# Patient Record
Sex: FEMALE | Race: BLACK OR AFRICAN AMERICAN | NOT HISPANIC OR LATINO | Employment: FULL TIME | URBAN - METROPOLITAN AREA
[De-identification: names, ages, dates, MRNs, and addresses within clinical notes are randomized per-mention and may not be internally consistent; named-entity substitution may affect disease eponyms.]

---

## 2017-03-21 ENCOUNTER — HISTORICAL (OUTPATIENT)
Dept: PHYSICAL THERAPY | Facility: HOSPITAL | Age: 47
End: 2017-03-21

## 2017-03-28 ENCOUNTER — HISTORICAL (OUTPATIENT)
Dept: PHYSICAL THERAPY | Facility: HOSPITAL | Age: 47
End: 2017-03-28

## 2017-03-29 ENCOUNTER — HISTORICAL (OUTPATIENT)
Dept: PHYSICAL THERAPY | Facility: HOSPITAL | Age: 47
End: 2017-03-29

## 2017-04-04 ENCOUNTER — HISTORICAL (OUTPATIENT)
Dept: PHYSICAL THERAPY | Facility: HOSPITAL | Age: 47
End: 2017-04-04

## 2017-04-06 ENCOUNTER — HISTORICAL (OUTPATIENT)
Dept: PHYSICAL THERAPY | Facility: HOSPITAL | Age: 47
End: 2017-04-06

## 2017-04-12 ENCOUNTER — HISTORICAL (OUTPATIENT)
Dept: PHYSICAL THERAPY | Facility: HOSPITAL | Age: 47
End: 2017-04-12

## 2017-04-17 ENCOUNTER — HISTORICAL (OUTPATIENT)
Dept: PHYSICAL THERAPY | Facility: HOSPITAL | Age: 47
End: 2017-04-17

## 2017-04-24 ENCOUNTER — HISTORICAL (OUTPATIENT)
Dept: PHYSICAL THERAPY | Facility: HOSPITAL | Age: 47
End: 2017-04-24

## 2017-04-24 ENCOUNTER — HISTORICAL (OUTPATIENT)
Dept: ADMINISTRATIVE | Facility: HOSPITAL | Age: 47
End: 2017-04-24

## 2017-05-10 ENCOUNTER — HISTORICAL (OUTPATIENT)
Dept: ORTHOPEDICS | Facility: CLINIC | Age: 47
End: 2017-05-10

## 2017-08-23 ENCOUNTER — HISTORICAL (OUTPATIENT)
Dept: ADMINISTRATIVE | Facility: HOSPITAL | Age: 47
End: 2017-08-23

## 2018-05-23 ENCOUNTER — HISTORICAL (OUTPATIENT)
Dept: ADMINISTRATIVE | Facility: HOSPITAL | Age: 48
End: 2018-05-23

## 2018-06-07 ENCOUNTER — TELEPHONE (OUTPATIENT)
Dept: OPHTHALMOLOGY | Facility: CLINIC | Age: 48
End: 2018-06-07

## 2018-06-07 ENCOUNTER — INITIAL CONSULT (OUTPATIENT)
Dept: OPHTHALMOLOGY | Facility: CLINIC | Age: 48
End: 2018-06-07
Payer: MEDICAID

## 2018-06-07 DIAGNOSIS — H50.10 EXOTROPIA: Primary | ICD-10-CM

## 2018-06-07 DIAGNOSIS — H50.30 INTERMITTENT EXOTROPIA: Primary | ICD-10-CM

## 2018-06-07 PROCEDURE — 99202 OFFICE O/P NEW SF 15 MIN: CPT | Mod: PBBFAC | Performed by: OPHTHALMOLOGY

## 2018-06-07 PROCEDURE — 92060 SENSORIMOTOR EXAMINATION: CPT | Mod: PBBFAC | Performed by: OPHTHALMOLOGY

## 2018-06-07 PROCEDURE — 99999 PR PBB SHADOW E&M-NEW PATIENT-LVL II: CPT | Mod: PBBFAC,,, | Performed by: OPHTHALMOLOGY

## 2018-06-07 PROCEDURE — 92060 SENSORIMOTOR EXAMINATION: CPT | Mod: 26,S$PBB,, | Performed by: OPHTHALMOLOGY

## 2018-06-07 PROCEDURE — 92002 INTRM OPH EXAM NEW PATIENT: CPT | Mod: S$PBB,,, | Performed by: OPHTHALMOLOGY

## 2018-06-07 RX ORDER — TRAMADOL HYDROCHLORIDE 50 MG/1
TABLET ORAL
Refills: 0 | COMMUNITY
Start: 2018-05-24 | End: 2023-12-10

## 2018-06-07 RX ORDER — METHOCARBAMOL 500 MG/1
TABLET, FILM COATED ORAL
Refills: 0 | COMMUNITY
Start: 2018-05-30 | End: 2023-12-10

## 2018-06-07 NOTE — PROGRESS NOTES
HPI     48 yo female c/o OS intermittently turning outward. She says she will   sometimes see double. She thinks the images are horizontal. She says this   has been going on for about 25 years. No previous eye surgeries.     Last edited by Sara Andrea on 6/7/2018 11:50 AM. (History)            Assessment /Plan     For exam results, see Encounter Report.    Intermittent exotropia      Consider surgical correction to correct Exotropia. The details of the surgical procedure were discussed. The risks of the procedure were identified and explained. Treatment alternatives were listed.    Procedure plan would be to loosen the outer muscles of each eye.  95% success rate with 5% chance need for repeat surgery in a life time.  Use of adjustable suture to ensure a better out come. Educated about chance for over corrections creating ET and diplopia.   Can adjust if ET after surgery or repeat surgery    Mrs. Alexander understands procedure and risk and would like to schedule surgery     This service was scribed by Livia Brink for, and in the presence of Dr Canchola who personally performed this service.    Livia Brink, COA    Katrin Canchola MD

## 2018-06-07 NOTE — PROGRESS NOTES
HPI     48 yo female c/o OS intermittently turning outward. She says she will   sometimes see double. She thinks the images are horizontal. She says this   has been going on for about 25 years. No previous eye surgeries.     Last edited by Sara Andrea on 6/7/2018 11:50 AM. (History)            Assessment /Plan     For exam results, see Encounter Report.    Intermittent exotropia      With diplopia  Consider surgical correction. The details of the surgical procedure were discussed. The risks of the procedure were identified and explained. Treatment alternatives were listed.   Recess LR OU

## 2018-06-07 NOTE — LETTER
June 7, 2018      Brian Flores MD  [address] [place] [address]           [facility]hthal[facility]  [address]  [place] [address]  Phone: [phone]  Fax: [phone]          Patient: Erlinda Alexander   MR Number: [id number]   YOB: 1970   Date of Visit: 6/7/2018       Dear Dr. Brian Flores:    Thank you for referring Erlinda Alexander to me for evaluation. Attached you will find relevant portions of my assessment and plan of care.    If you have questions, please do not hesitate to call me. I look forward to following Erlinda Alexander along with you.    Sincerely,    MAC Canchola Jr., MD    Enclosure  CC:  No Recipients    If you would like to receive this communication electronically, please contact [email or web address] or [phone] to request more information on [facility] access.    For providers and/or their staff who would like to refer a patient to [facility], please contact us through our one-stop-shop provider referral line, [facility], at [phone].    If you feel you have received this communication in error or would no longer like to receive these types of communications, please e-mail [email or web address]

## 2018-06-18 NOTE — BRIEF OP NOTE
Brief Operative Note  Ophthalmology Service      Date of Procedure: (Not on file)     Attending Physician: MAC Canchola Jr., MD     Assistant: LYNN Vasques MD    Pre-Operative Diagnosis: Exotropia [H50.10]     Post-Operative Diagnosis: Same as pre-operative diagnosis    Treatments/Procedures: Recess LR OU 7.5 mmw/adj OS    Intraoperative Findings: nl EOM's    Anesthesia: General    Complications: None    Estimated Blood Loss: < 5 cc    Specimens: None    -------------------------------------------------------------  Full dictated Operative Report to follow.  -------------------------------------------------------------

## 2018-06-19 ENCOUNTER — TELEPHONE (OUTPATIENT)
Dept: OPTOMETRY | Facility: CLINIC | Age: 48
End: 2018-06-19

## 2018-06-19 ENCOUNTER — ANESTHESIA EVENT (OUTPATIENT)
Dept: SURGERY | Facility: HOSPITAL | Age: 48
End: 2018-06-19
Payer: MEDICAID

## 2018-06-19 RX ORDER — METOPROLOL TARTRATE 25 MG/1
25 TABLET, FILM COATED ORAL 2 TIMES DAILY
COMMUNITY

## 2018-06-19 NOTE — DISCHARGE SUMMARY
Discharge Summary  Ophthalmology Service      Admit Date: 6/20/2018     Discharge Date: 6/19/2018     Attending Physician: MAC Canchola Jr., MD     Discharge Physician: Fely Vasques MD    Discharged Condition: Good    Reason for Admission: Exotropia [H50.10]  Strabismus [H50.9]     Treatments/Procedures: Strabismus Surgery (see dictated report for details).    Hospital Course: Stable, dictated    Consults: None    Significant Diagnostic Studies: None    Disposition: Home    Patient Instructions:   - Resume same diet as prior to surgery  - Resume activity as tolerated with no swimming for 1 week  - Apply ice packs to surgical eye(s) for 72 hours as tolerated  - Call the Ophthalmology clinic to schedule an appointment with Dr. Canchola.    Patient Instructions:   Current Discharge Medication List      CONTINUE these medications which have NOT CHANGED    Details   methocarbamol (ROBAXIN) 500 MG Tab Refills: 0      metoprolol tartrate (LOPRESSOR) 25 MG tablet Take 25 mg by mouth 2 (two) times daily.      traMADol (ULTRAM) 50 mg tablet Refills: 0               Discharge Procedure Orders  Diet Adult Regular     Activity as tolerated     No dressing needed

## 2018-06-19 NOTE — DISCHARGE INSTRUCTIONS
ACTIVITY LEVEL:  If you received sedation or an anesthetic, you may feel sleepy for several hours. Rest until you are more awake. Gradually resume your normal activities in two days. Children may return to school in 2-3 days. It is all right to watch television or to read. Swimming is permitted in two weeks.    CARE OF INCISION:  A blood-tinged discharge from the eye is normal. This can be gently washed away with a clean, damp wash cloth. Do not use water, gauze, or cotton to wipe the lids. The morning after surgery, you may have difficulty opening your eyes. This is normal. If dry blood or secretions are holding the lids together, you may open the eyes by gently  the lids from above and below. Please wash your hands thoroughly before doing this. If the lids dont open, do not force them apart. (A child will cry and the tears will soften the secretions and a parent can try again later.) Use cool compresses to the eyes for 24 hours, if tolerated for comfort. Do not place any medication in the eye unless otherwise instructed.    BATHING:  Keep your face out of water for five days after surgery - NO SHOWERS.    DIET:  At home, continue with liquids, and if there is no nausea, you may eat a soft diet. Gradually resume your  normal diet.    PAIN:  If needed for discomfort, you can use cold compresses and take Tylenol (usual recommended dose) every four  hours. Generally, do not take Tylenol more than four times a day.    WHEN TO CALL THE DOCTOR:   Any increase in the amount of swelling of the eyes and adjacent tissues   Heavy yellow discharge from the eyes   Fever over 101ºF (38.4ºC)    A purple discoloration of the lower lids is common. It appears a few days after surgery and does not affect healing. You may experience double vision after surgery. This is normal and will disappear in a few days or weeks. Prescription glasses may be worn unless otherwise instructed. The eyes may be unusually sensitive to  light for several days. Dark sunglasses will help.    FOR EMERGENCIES:  If any unusual problems or difficulties occur, contact Dr. Canchola or the resident at [phone] (page ) or at the Clinic office, [phone].

## 2018-06-19 NOTE — PRE-PROCEDURE INSTRUCTIONS
PreOp Instructions given:     - Verbal medication information (what to hold and what to take)   - NPO guidelines   - Arrival place directions given;  - Bathing with antibacterial soap   - Don't wear any jewelry or bring any valuables AM of surgery   - No makeup or moisturizer to face   - No perfume/cologne, powder, lotions or aftershave     Pt. verbalized understanding.   Denies any  history of side effects or issues with anesthesia or sedation.

## 2018-06-20 ENCOUNTER — HOSPITAL ENCOUNTER (OUTPATIENT)
Facility: HOSPITAL | Age: 48
Discharge: HOME | End: 2018-06-20
Attending: OPHTHALMOLOGY | Admitting: OPHTHALMOLOGY
Payer: MEDICAID

## 2018-06-20 ENCOUNTER — TELEPHONE (OUTPATIENT)
Dept: OPHTHALMOLOGY | Facility: CLINIC | Age: 48
End: 2018-06-20

## 2018-06-20 ENCOUNTER — NURSE TRIAGE (OUTPATIENT)
Dept: ADMINISTRATIVE | Facility: CLINIC | Age: 48
End: 2018-06-20

## 2018-06-20 ENCOUNTER — ANESTHESIA (OUTPATIENT)
Dept: SURGERY | Facility: HOSPITAL | Age: 48
End: 2018-06-20
Payer: MEDICAID

## 2018-06-20 VITALS
SYSTOLIC BLOOD PRESSURE: 128 MMHG | DIASTOLIC BLOOD PRESSURE: 78 MMHG | WEIGHT: 185 LBS | OXYGEN SATURATION: 100 % | HEIGHT: 61 IN | RESPIRATION RATE: 14 BRPM | HEART RATE: 85 BPM | BODY MASS INDEX: 34.93 KG/M2 | TEMPERATURE: 98 F

## 2018-06-20 DIAGNOSIS — H50.9 STRABISMUS: ICD-10-CM

## 2018-06-20 DIAGNOSIS — H50.10 EXOTROPIA: Primary | ICD-10-CM

## 2018-06-20 LAB
B-HCG UR QL: NEGATIVE
CTP QC/QA: YES

## 2018-06-20 PROCEDURE — 71000033 HC RECOVERY, INTIAL HOUR: Performed by: OPHTHALMOLOGY

## 2018-06-20 PROCEDURE — 36000706: Performed by: OPHTHALMOLOGY

## 2018-06-20 PROCEDURE — 71000039 HC RECOVERY, EACH ADD'L HOUR: Performed by: OPHTHALMOLOGY

## 2018-06-20 PROCEDURE — 67311 REVISE EYE MUSCLE: CPT | Mod: 50,,, | Performed by: OPHTHALMOLOGY

## 2018-06-20 PROCEDURE — 67335 EYE SUTURE DURING SURGERY: CPT | Mod: LT,,, | Performed by: OPHTHALMOLOGY

## 2018-06-20 PROCEDURE — 71000016 HC POSTOP RECOV ADDL HR: Performed by: OPHTHALMOLOGY

## 2018-06-20 PROCEDURE — 25000003 PHARM REV CODE 250: Performed by: ANESTHESIOLOGY

## 2018-06-20 PROCEDURE — 37000009 HC ANESTHESIA EA ADD 15 MINS: Performed by: OPHTHALMOLOGY

## 2018-06-20 PROCEDURE — 36000707: Performed by: OPHTHALMOLOGY

## 2018-06-20 PROCEDURE — D9220A PRA ANESTHESIA: Mod: ,,, | Performed by: ANESTHESIOLOGY

## 2018-06-20 PROCEDURE — 25000003 PHARM REV CODE 250: Performed by: OPHTHALMOLOGY

## 2018-06-20 PROCEDURE — 37000008 HC ANESTHESIA 1ST 15 MINUTES: Performed by: OPHTHALMOLOGY

## 2018-06-20 PROCEDURE — 71000015 HC POSTOP RECOV 1ST HR: Performed by: OPHTHALMOLOGY

## 2018-06-20 PROCEDURE — 63600175 PHARM REV CODE 636 W HCPCS: Performed by: STUDENT IN AN ORGANIZED HEALTH CARE EDUCATION/TRAINING PROGRAM

## 2018-06-20 PROCEDURE — 81025 URINE PREGNANCY TEST: CPT | Performed by: ANESTHESIOLOGY

## 2018-06-20 RX ORDER — MIDAZOLAM HYDROCHLORIDE 1 MG/ML
INJECTION, SOLUTION INTRAMUSCULAR; INTRAVENOUS
Status: DISCONTINUED | OUTPATIENT
Start: 2018-06-20 | End: 2018-06-20

## 2018-06-20 RX ORDER — ACETAMINOPHEN AND CODEINE PHOSPHATE 300; 30 MG/1; MG/1
1 TABLET ORAL EVERY 8 HOURS PRN
Qty: 15 TABLET | Refills: 0 | Status: SHIPPED | OUTPATIENT
Start: 2018-06-20 | End: 2023-12-10

## 2018-06-20 RX ORDER — LIDOCAINE HCL/PF 100 MG/5ML
SYRINGE (ML) INTRAVENOUS
Status: DISCONTINUED | OUTPATIENT
Start: 2018-06-20 | End: 2018-06-20

## 2018-06-20 RX ORDER — LIDOCAINE HYDROCHLORIDE 10 MG/ML
1 INJECTION, SOLUTION EPIDURAL; INFILTRATION; INTRACAUDAL; PERINEURAL ONCE
Status: COMPLETED | OUTPATIENT
Start: 2018-06-20 | End: 2018-06-20

## 2018-06-20 RX ORDER — ONDANSETRON 2 MG/ML
4 INJECTION INTRAMUSCULAR; INTRAVENOUS DAILY PRN
Status: DISCONTINUED | OUTPATIENT
Start: 2018-06-20 | End: 2018-06-20 | Stop reason: HOSPADM

## 2018-06-20 RX ORDER — NEOMYCIN SULFATE, POLYMYXIN B SULFATE, AND DEXAMETHASONE 3.5; 10000; 1 MG/G; [USP'U]/G; MG/G
OINTMENT OPHTHALMIC
Status: DISCONTINUED | OUTPATIENT
Start: 2018-06-20 | End: 2018-06-20 | Stop reason: HOSPADM

## 2018-06-20 RX ORDER — PHENYLEPHRINE HYDROCHLORIDE 25 MG/ML
SOLUTION/ DROPS OPHTHALMIC
Status: DISCONTINUED | OUTPATIENT
Start: 2018-06-20 | End: 2018-06-20 | Stop reason: HOSPADM

## 2018-06-20 RX ORDER — FENTANYL CITRATE 50 UG/ML
INJECTION, SOLUTION INTRAMUSCULAR; INTRAVENOUS
Status: DISCONTINUED | OUTPATIENT
Start: 2018-06-20 | End: 2018-06-20

## 2018-06-20 RX ORDER — SODIUM CHLORIDE 9 MG/ML
INJECTION, SOLUTION INTRAVENOUS CONTINUOUS
Status: DISCONTINUED | OUTPATIENT
Start: 2018-06-20 | End: 2018-06-20 | Stop reason: HOSPADM

## 2018-06-20 RX ORDER — PROPOFOL 10 MG/ML
VIAL (ML) INTRAVENOUS
Status: DISCONTINUED | OUTPATIENT
Start: 2018-06-20 | End: 2018-06-20

## 2018-06-20 RX ORDER — ONDANSETRON 2 MG/ML
INJECTION INTRAMUSCULAR; INTRAVENOUS
Status: DISCONTINUED | OUTPATIENT
Start: 2018-06-20 | End: 2018-06-20

## 2018-06-20 RX ORDER — NEOMYCIN SULFATE, POLYMYXIN B SULFATE, AND DEXAMETHASONE 3.5; 10000; 1 MG/G; [USP'U]/G; MG/G
OINTMENT OPHTHALMIC
Status: DISCONTINUED
Start: 2018-06-20 | End: 2018-06-20 | Stop reason: HOSPADM

## 2018-06-20 RX ORDER — SODIUM CHLORIDE 0.9 % (FLUSH) 0.9 %
3 SYRINGE (ML) INJECTION
Status: DISCONTINUED | OUTPATIENT
Start: 2018-06-20 | End: 2018-06-20 | Stop reason: HOSPADM

## 2018-06-20 RX ORDER — HYDROMORPHONE HYDROCHLORIDE 1 MG/ML
0.2 INJECTION, SOLUTION INTRAMUSCULAR; INTRAVENOUS; SUBCUTANEOUS EVERY 5 MIN PRN
Status: DISCONTINUED | OUTPATIENT
Start: 2018-06-20 | End: 2018-06-20 | Stop reason: HOSPADM

## 2018-06-20 RX ORDER — PHENYLEPHRINE HYDROCHLORIDE 25 MG/ML
SOLUTION/ DROPS OPHTHALMIC
Status: DISCONTINUED
Start: 2018-06-20 | End: 2018-06-20 | Stop reason: HOSPADM

## 2018-06-20 RX ADMIN — PROPOFOL 200 MG: 10 INJECTION, EMULSION INTRAVENOUS at 08:06

## 2018-06-20 RX ADMIN — FENTANYL CITRATE 25 MCG: 50 INJECTION, SOLUTION INTRAMUSCULAR; INTRAVENOUS at 08:06

## 2018-06-20 RX ADMIN — ONDANSETRON 4 MG: 2 INJECTION INTRAMUSCULAR; INTRAVENOUS at 08:06

## 2018-06-20 RX ADMIN — Medication 0.2 MG: at 10:06

## 2018-06-20 RX ADMIN — LIDOCAINE HYDROCHLORIDE 100 MG: 20 INJECTION, SOLUTION INTRAVENOUS at 08:06

## 2018-06-20 RX ADMIN — SODIUM CHLORIDE: 0.9 INJECTION, SOLUTION INTRAVENOUS at 07:06

## 2018-06-20 RX ADMIN — LIDOCAINE HYDROCHLORIDE 10 MG: 10 INJECTION, SOLUTION EPIDURAL; INFILTRATION; INTRACAUDAL; PERINEURAL at 07:06

## 2018-06-20 RX ADMIN — FENTANYL CITRATE 50 MCG: 50 INJECTION, SOLUTION INTRAMUSCULAR; INTRAVENOUS at 08:06

## 2018-06-20 RX ADMIN — MIDAZOLAM HYDROCHLORIDE 2 MG: 1 INJECTION, SOLUTION INTRAMUSCULAR; INTRAVENOUS at 08:06

## 2018-06-20 NOTE — TELEPHONE ENCOUNTER
Returned Mrs. Alexander's call.  She wanted a pain pill called into [facility].  I called Dr. Canchola to relay the message.  He gave verbal orders for Tylenol 3.  Dispense 15 with no refills.  I then called Mrs. Alexander back to confirm that I was able to call in a pain pill and discussed the difference between the muscle pain and the suture pain.  Advised to use the mumtaz for the suture pain.  Gave directions to her son to help her with installing the ointment in her eyes

## 2018-06-20 NOTE — TELEPHONE ENCOUNTER
Reason for Disposition   Caller has URGENT medication question about med that PCP prescribed and triager unable to answer question    Answer Assessment - Initial Assessment Questions  Pt had eye surgery today. She called office requesting something for pain -the nurse called her back and advised yumiko had ordered pain med that she could call in. Checking with pharmacy they have not received any orders.    Protocols used: ST MEDICATION QUESTION CALL-A-    There was a note in chart about pain med authorized but nothing found on med card.

## 2018-06-20 NOTE — TRANSFER OF CARE
"Anesthesia Transfer of Care Note    Patient: Erlinda Alexander    Procedure(s) Performed: Procedure(s) (LRB):  STRABISMUS SURGERY (Bilateral)    Patient location: PACU    Anesthesia Type: general    Transport from OR: Transported from OR on 6-10 L/min O2 by face mask with adequate spontaneous ventilation    Post pain: adequate analgesia    Post assessment: no apparent anesthetic complications and tolerated procedure well    Post vital signs: stable    Level of consciousness: awake and alert    Nausea/Vomiting: no nausea/vomiting    Complications: none          Last vitals:   Visit Vitals  /83 (BP Location: Left arm, Patient Position: Lying)   Pulse 74   Temp 37 °C (98.6 °F) (Temporal)   Resp 16   Ht 5' 1" (1.549 m)   Wt 83.9 kg (185 lb)   LMP 06/13/2018   SpO2 100%   Breastfeeding? No   BMI 34.96 kg/m²     "

## 2018-06-20 NOTE — PLAN OF CARE
Discharge instructions given to patient and son. Educated patient on procedure and post op instructions, medications and when to follow up within designated time frame. Patient verbalized understanding. Patient denies pain and nausea at this time. PO fluids tolerated.

## 2018-06-20 NOTE — H&P
Pre-Operative History & Physical  Ophthalmology      SUBJECTIVE:     History of Present Illness:  Patient is a 47 y.o. female presents with strabismus    MEDICATIONS:   PTA Medications   Medication Sig    methocarbamol (ROBAXIN) 500 MG Tab     metoprolol tartrate (LOPRESSOR) 25 MG tablet Take 25 mg by mouth 2 (two) times daily.    traMADol (ULTRAM) 50 mg tablet        ALLERGIES:   Review of patient's allergies indicates:   Allergen Reactions    Doxycycline hyclate (bulk) Rash    Latex, natural rubber Rash    Pcn [penicillins] Rash    Sulfa (sulfonamide antibiotics) Rash       PAST MEDICAL HISTORY:   Past Medical History:   Diagnosis Date    Seizures      PAST SURGICAL HISTORY:   Past Surgical History:   Procedure Laterality Date     SECTION      x2    CYST REMOVAL  2004    from back     PAST FAMILY HISTORY: History reviewed. No pertinent family history.  SOCIAL HISTORY:   Social History   Substance Use Topics    Smoking status: Current Every Day Smoker     Packs/day: 1.00     Types: Cigarettes    Smokeless tobacco: Never Used    Alcohol use No        MENTAL STATUS: Alert    REVIEW OF SYSTEMS: Negative    OBJECTIVE:     Vital Signs (Most Recent)  Temp: 98.6 °F (37 °C) (18)  Pulse: 74 (1836)  Resp: 16 (18)  BP: 135/83 (18 0736)  SpO2: 100 % (18)    Physical Exam:  General: NAD  HEENT: Strabismus  Lungs: Adequate respirations  Heart: + pulses  Abdomen: Soft    ASSESSMENT/PLAN:     Patient is a 47 y.o. female with strabismus     - Risks/benefits/alternatives of the procedure discussed with the patient   - Informed consent obtained prior to surgery and the patient voiced good understanding.   - To OR for surgical correction of strabismus

## 2018-06-20 NOTE — ANESTHESIA PREPROCEDURE EVALUATION
06/20/2018  Erlinda Alexander is a 47 y.o., female.    Anesthesia Evaluation    I have reviewed the Patient Summary Reports.    I have reviewed the Nursing Notes.   I have reviewed the Medications.     Review of Systems  Anesthesia Hx:  No problems with previous Anesthesia    Cardiovascular:   Hypertension    Pulmonary:  Pulmonary Normal    Neurological:  Neurology Normal        Physical Exam  General:  Well nourished, Obesity    Airway/Jaw/Neck:  Airway Findings: Mouth Opening: Normal Tongue: Normal  General Airway Assessment: Adult  Mallampati: III  Improves to II with phonation.  TM Distance: Normal, at least 6 cm      Dental:  Dental Findings: In tact   Chest/Lungs:  Chest/Lungs Findings: Clear to auscultation     Heart/Vascular:  Heart Findings: Rate: Normal  Rhythm: Regular Rhythm  Sounds: Normal        Mental Status:  Mental Status Findings:  Cooperative, Alert and Oriented         Anesthesia Plan  Type of Anesthesia, risks & benefits discussed:  Anesthesia Type:  general  Patient's Preference:   Intra-op Monitoring Plan: standard ASA monitors  Intra-op Monitoring Plan Comments:   Post Op Pain Control Plan: multimodal analgesia  Post Op Pain Control Plan Comments:   Induction:   IV  Beta Blocker:  Patient is not currently on a Beta-Blocker (No further documentation required).       Informed Consent: Patient understands risks and agrees with Anesthesia plan.  Questions answered. Anesthesia consent signed with patient.  ASA Score: 2     Day of Surgery Review of History & Physical:    H&P update referred to the surgeon.         Ready For Surgery From Anesthesia Perspective.

## 2018-06-20 NOTE — TELEPHONE ENCOUNTER
----- Message from Hafsa Gusman sent at 6/20/2018 12:20 PM CDT -----  Contact: Jan Aguirre(Son)  Rx Refill/Request     Is this a Refill or New Rx:  New  Rx Name and Strength:  Pt was told she would be given pain medication and need it sent out right away.  Preferred Pharmacy with phone number: [facility] Address: [address] [place] [address]/Phone: [phone]  Communication Preference:[phone]  Additional Information: Pt had surgery today.

## 2018-06-21 ENCOUNTER — TELEPHONE (OUTPATIENT)
Dept: OPHTHALMOLOGY | Facility: CLINIC | Age: 48
End: 2018-06-21

## 2018-06-21 NOTE — TELEPHONE ENCOUNTER
Spoke to Mrs. Alexander about what to expect after strabismus surgery, she expressed that she is having diplopia. I explained reasons for diplopia after exotropia repair.  Scheduled a one week appt for questionable adjustment as well as her one clay post op appointment.  Advised if over the weekend the diplopia completely clears, she can cancel the Tuesday appointment.

## 2018-06-21 NOTE — OP NOTE
DATE OF PROCEDURE:  06/20/2018    SURGEON:  MAC Canchola M.D.    ASSISTANT:  Dr. Vasques.    PREOPERATIVE DIAGNOSIS:  Strabismus - exotropia.    POSTOPERATIVE DIAGNOSIS:  Strabismus - exotropia.    PROCEDURE:  Recession, lateral rectus, both eyes, 7.5 mm with adjustable suture,   left eye.    COMPLICATIONS:  None.    BLOOD LOSS:  Less than 2 mL.    PROCEDURE IN DETAIL:  The patient was brought to the operating suite, where   general intubation anesthesia was achieved.  Both eyes were prepped and draped   in sterile fashion and lid speculum was placed in the right eye.  Through an   inferotemporal fornix incision, the lateral rectus muscle was identified and   placed on a muscle hook.  It was cleared of its check ligaments and a   double-armed 6-0 Vicryl suture was passed through the muscle belly 1 mm   posterior to the insertion.  Locked bites were placed in the middle and upper   and lower edge of the muscle.  The muscle was disinserted from the globe and   reattached to the sclera 7.5 mm posteriorly.  The conjunctiva was   reapproximated.  Attention was directed to the left eye where a similar   procedure was performed in this eye and an adjustable suture was used.  Sutures   were placed through the sclera at the insertion site and a new suture was placed   around the muscle suture and positioned 7.5 mm distally.  The muscle was   allowed to retract back this amount.  The sutures were buried and the   conjunctiva was reapproximated.  Betadine solution and Maxitrol ointment were   placed in the eye.  The patient was brought to the recovery room in good   condition.      HE/HN  dd: 06/20/2018 09:11:13 (CDT)  td: 06/20/2018 23:32:20 (CDT)  Doc ID   #[id number]  Job ID #[id number]    CC:

## 2018-06-25 ENCOUNTER — TELEPHONE (OUTPATIENT)
Dept: OPHTHALMOLOGY | Facility: CLINIC | Age: 48
End: 2018-06-25

## 2018-06-25 NOTE — TELEPHONE ENCOUNTER
06/25/18  Kenyetta returned pt call and she had some question her eyes after Strab. Sx regarding the suture and raised are near the suture. I explained to pt that the raised are is normal and will go away. Pt verified arrival time of 9 a.m. For possible adjustments. rosie       ----- Message from Anya Bowers sent at 6/25/2018 11:27 AM CDT -----  Contact: Erlinda Alexander   Pt would like to speak with  nurse please ,pt can be reached at [phone] please thank you.

## 2018-06-26 ENCOUNTER — OFFICE VISIT (OUTPATIENT)
Dept: OPHTHALMOLOGY | Facility: CLINIC | Age: 48
End: 2018-06-26
Payer: MEDICAID

## 2018-06-26 DIAGNOSIS — Z98.890 POST-OPERATIVE STATE: Primary | ICD-10-CM

## 2018-06-26 PROCEDURE — 99212 OFFICE O/P EST SF 10 MIN: CPT | Mod: PBBFAC | Performed by: OPHTHALMOLOGY

## 2018-06-26 PROCEDURE — 99999 PR PBB SHADOW E&M-EST. PATIENT-LVL II: CPT | Mod: PBBFAC,,, | Performed by: OPHTHALMOLOGY

## 2018-06-26 PROCEDURE — 99024 POSTOP FOLLOW-UP VISIT: CPT | Mod: ,,, | Performed by: OPHTHALMOLOGY

## 2018-06-26 RX ORDER — TOBRAMYCIN AND DEXAMETHASONE 3; 1 MG/ML; MG/ML
1-2 SUSPENSION/ DROPS OPHTHALMIC
Qty: 5 ML | Refills: 0 | Status: SHIPPED | OUTPATIENT
Start: 2018-06-26 | End: 2018-07-06

## 2018-06-26 NOTE — PROGRESS NOTES
HPI     47 yr old here for a one week s/p Recession, lateral rectus, both eyes,   7.5 mm with adjustable suture,left eye. Mrs. Alexander states that she has   eye pain 8/10 that comes and goes, she complains that the tylenol 3 is not   helping reduce the pain.  She has constant diplopia side by side for the   first hour after waking and then it comes and goes through out the day.    Images are close together. She is using maxitrol ointment OU twice a day   and states that vision is blurred.    Last edited by MAC Canchola Jr., MD on 6/26/2018  9:08 AM. (History)          ROS     Positive for: Eyes    Last edited by MAC Canchola Jr., MD on 6/26/2018  9:08 AM. (History)          Assessment /Plan     For exam results, see Encounter Report.    Post-operative state  -     tobramycin-dexamethasone 0.3-0.1% (TOBRADEX) 0.3-0.1 % DrpS; Place 1-2 drops into both eyes every 4 (four) hours while awake. for 10 days  Dispense: 5 mL; Refill: 0      The patient has had the desired result of the surgical procedure. Ortho in primary gaze  Educated about surface swelling, chemosis, creating discomfort.   Discontinue ointment and start Tobradex drops.  Keep one month post op exam that is scheduled     This service was scribed by Livia Brink for, and in the presence of Dr Canchola who personally performed this service.    Livia Brink, COA    Katrin Canchola MD

## 2018-07-02 NOTE — ANESTHESIA POSTPROCEDURE EVALUATION
"Anesthesia Post Evaluation    Patient: Erlinda Alexander    Procedure(s) Performed: Procedure(s) (LRB):  STRABISMUS SURGERY (Bilateral)    Final Anesthesia Type: general  Patient location during evaluation: PACU  Patient participation: Yes- Able to Participate  Level of consciousness: awake  Post-procedure vital signs: reviewed and stable  Pain management: adequate  Airway patency: patent  PONV status at discharge: No PONV  Anesthetic complications: no      Cardiovascular status: stable  Respiratory status: unassisted  Hydration status: euvolemic  Follow-up not needed.        Visit Vitals  /78   Pulse 85   Temp 36.8 °C (98.2 °F) (Temporal)   Resp 14   Ht 5' 1" (1.549 m)   Wt 83.9 kg (185 lb)   LMP 06/13/2018   SpO2 100%   Breastfeeding? No   BMI 34.96 kg/m²       Pain/Jacquie Score: No Data Recorded      "

## 2018-07-06 ENCOUNTER — TELEPHONE (OUTPATIENT)
Dept: OPHTHALMOLOGY | Facility: CLINIC | Age: 48
End: 2018-07-06

## 2018-07-06 NOTE — TELEPHONE ENCOUNTER
----- Message from Loulou Moore sent at 7/6/2018  2:24 PM CDT -----  Contact: Pt  Needs Advice    Reason for call: needs medical advice    Communication Preference: Pt can be reached at [phone]    Additional Information: Ms. Alexander states that she's left eye has been sensitive to light and feeling some stabbing pain. She would like to know if that's to be expected?

## 2018-07-06 NOTE — TELEPHONE ENCOUNTER
Spoke to Rusty Vossrosio and advised that she is having a response to the adjustable suture and advised to use artifical tears several times a day as needed for comfort

## 2018-07-24 ENCOUNTER — TELEPHONE (OUTPATIENT)
Dept: OPHTHALMOLOGY | Facility: CLINIC | Age: 48
End: 2018-07-24

## 2018-07-24 NOTE — TELEPHONE ENCOUNTER
07/24/18  Kenyetta returned pt call and pt asked to be r/s to a later date. I have r/s PO appt to August. stshanna       ----- Message from Kenzie Jordan sent at 7/24/2018 12:40 PM CDT -----  Contact: self  Pt would like to reschedule her post op.  She can be reached at [phone]

## 2018-08-13 ENCOUNTER — HISTORICAL (OUTPATIENT)
Dept: RADIOLOGY | Facility: HOSPITAL | Age: 48
End: 2018-08-13

## 2022-04-07 ENCOUNTER — HISTORICAL (OUTPATIENT)
Dept: ADMINISTRATIVE | Facility: HOSPITAL | Age: 52
End: 2022-04-07
Payer: MEDICAID

## 2022-04-23 VITALS — HEIGHT: 61 IN | BODY MASS INDEX: 34.17 KG/M2 | WEIGHT: 181 LBS

## 2022-05-01 NOTE — HISTORICAL [facility] CERNER
This is a historical note converted from Naren. Formatting and pictures may have been removed.  Please reference Naren for original formatting and attached multimedia. Chief Complaint  F/U from ED for Rt shoulder pain.  History of Present Illness  45 y/o F with longstanding right shoulder pain and limited motion/strength. She notes the paint for 1-2 years, denies any specific trauma. Denies neck pain, denies numbness/paresthesias. No other complaints. Has done 2 rounds of PT, multiple injections without relief  Review of Systems  Negative  Physical Exam  Vitals & Measurements  HT:?154.94?cm? HT:?154.94?cm? WT:?83.3?kg? WT:?83.3?kg? BMI:?34.7?  Awake, alert NAD  NLB on RA  RRR per pulse  EOMI  NCAT  ?  Cervical Exam  ROM without pain, no Lhermittes  Neg [place]  ?  RUE  No swelling  Mild TTP about shoulder  ?  ROM Active = passive    Abd 70  Int Iw  Ext 20  ?  LTS intact  Motor intact  2+ RP  Assessment/Plan  1.?Incomplete rotator cuff tear or rupture of right shoulder, not specified as traumatic  ??Will place patient on waiting list for shoulder cuff tears  ? Patient has failed injections in past and does not want any further at this point  2.?Tobacco user  Discussed importance of cessation   Problem List/Past Medical History  Ongoing  Migraines  Obesity  Pain of right shoulder joint  Seizure disorder  Tobacco user  Tobacco user  Tobacco user  Tobacco user  Historical  Procedure/Surgical History  CLOSURE OF SKIN AND SUBCUTANEOUS TISSUE OTHER SITES (10/30/2014)  Simple repair of superficial wounds of scalp, neck, axillae, external genitalia, trunk and/or extremities (including hands and feet); 2.5 cm or less. (10/30/2014)   delivery only;.  Cholecystectomy;.  Medications  ibuprofen 600 mg oral tablet, 600 mg= 1 tab(s), Oral, q8hr  TRAMADOL HCL 50 MG TABLET  Allergies  Latex  doxycycline  penicillins  sulfa drugs  Social History  Alcohol - Denies Alcohol Use, 2017  Never  Substance Abuse - Denies  Substance Abuse, 08/14/2017  Never  Tobacco - High Risk, 08/14/2017  Current every day smoker, Cigarettes, 10 per day. 24 year(s). Started age 18.0 Years. Previous treatment: None. Ready to change: Yes. Household tobacco concerns: No.  Family History  Stroke: Sister.  Diagnostic Results  No fracture noted, no significant degenerative changes in glenohumeral, mild AC arthritis      ? Ediths?medical history, physical exam findings right shoulder , diagnosis, and treatment outlined by?Dr. Navarro?has been reviewed.? Treatment plan is determined to be reasonable and appropriate.?I was present during the evaluation. X-rays reviewed. Smoking cessation is important.

## 2022-05-01 NOTE — HISTORICAL [facility] CERNER
This is a historical note converted from Naren. Formatting and pictures may have been removed.  Please reference Naren for original formatting and attached multimedia. Chief Complaint  F/U Rt RTC syndrome surgery booking.  History of Present Illness  47-year-old female presents to us for follow-up right shoulder pain  Known rotator cuff tear  Patient continues to complain of nighttime pain, pain with overhead activities  Patient was?supposed be placed in book?last time, unsure if this happened  Had last MRI 5/2017  Has failed therapy twice  Not interested in injections  Would?like surgery  Review of Systems  Negative  Physical Exam  Vitals & Measurements  WT:?82.1?kg? WT:?82.1?kg?  Cervical exam  Range of motion without pain  Negative Hoffmanns  ?  Right upper extremity  No swelling  Minimal tenderness palpation about shoulder, some moderate pain about AC joint  Able to forward flex to 90  Abduct to 90  Internally rotates to L5  External rotates about 30?  Positive pain with resisted?bicipital supination  3-5 strength to?resisted external rotation and empty can test  Full passive range of motion  Assessment/Plan  Right shoulder pain  ?47-year-old female with right rotator cuff tear,?biceps tendinosis/tendinitis,?AC joint arthritis  I believe patient will benefit from a right rotator cuff tear repair,?bicipital?tenotomy versus tenodesis,?subacromial decompression, potential distal clavicle excision  Place patient in our surgical weight?have also ordered a newer MRI, I believe that we can go to surgery with the MRI we have, though a?more recent MRI would be beneficial for surgical planning.  ?   Problem List/Past Medical History  Ongoing  Migraines  Obesity  Pain of right shoulder joint  Seizure disorder  Tobacco user  Tobacco user  Tobacco user  Tobacco user  Historical  No qualifying data  Procedure/Surgical History  Repair Right Hand Skin, External Approach (05/15/2018), Simple repair of superficial wounds of  scalp, neck, axillae, external genitalia, trunk and/or extremities (including hands and feet); 2.5 cm or less (05/15/2018), Repair Left Hand Subcutaneous Tissue and Fascia, Open Approach (2017), Simple repair of superficial wounds of scalp, neck, axillae, external genitalia, trunk and/or extremities (including hands and feet); 2.5 cm or less (2017), CLOSURE OF SKIN AND SUBCUTANEOUS TISSUE OTHER SITES (10/30/2014), Simple repair of superficial wounds of scalp, neck, axillae, external genitalia, trunk and/or extremities (including hands and feet); 2.5 cm or less. (10/30/2014),  delivery only;., Cholecystectomy;., CYST FROM BACK, tubal ligation.  Medications  Inpatient  No active inpatient medications  Home  diclofenac sodium 75 mg oral delayed release tablet, 75 mg= 1 tab(s), Oral, BID, PRN,? ?Not Taking, Completed Rx  HYDROCODONE/ACETAMINOPHEN 7.5-325 T, 1 tab(s), Oral, QID,? ?Not Taking, Completed Rx  TRAMADOL HCL 50 MG TABLET,? ?Not Taking, Completed Rx  Allergies  Latex  doxycycline  penicillins  sulfa drugs  Social History  Alcohol - Denies Alcohol Use, 2012  Never, 2016  Substance Abuse - Denies Substance Abuse, 2012  Never, 2016  Tobacco - High Risk, 2013  Current every day smoker, Cigarettes, 10 per day. 24 year(s). Started age 18.0 Years. Previous treatment: None. Ready to change: Yes. Household tobacco concerns: No., 2017  Family History  Stroke: Sister.  Immunizations  Vaccine Date Status Comments   tetanus/diphtheria/pertussis, acel(Tdap) 05/15/2018 Given    tetanus/diphtheria/pertussis, acel(Tdap) 10/30/2014 Given -Boostrix   Diagnostic Results  MRI reviewed, 2017      Erlinda Alexander?s?medical history, physical exam findings right shoulder?diagnosis, and treatment outlined by??has been reviewed.??Operative treatment plan is determined to be reasonable and appropriate.?I was present during the evaluation. ?X-rays and MRI right  shoulder reviewed. Smoking cessation is recommended?because continued smoking may inhibit post surgery healing.

## 2023-05-14 ENCOUNTER — HOSPITAL ENCOUNTER (EMERGENCY)
Facility: HOSPITAL | Age: 53
Discharge: HOME | End: 2023-05-14
Attending: STUDENT IN AN ORGANIZED HEALTH CARE EDUCATION/TRAINING PROGRAM
Payer: COMMERCIAL

## 2023-05-14 VITALS
BODY MASS INDEX: 38.14 KG/M2 | DIASTOLIC BLOOD PRESSURE: 96 MMHG | SYSTOLIC BLOOD PRESSURE: 157 MMHG | TEMPERATURE: 98 F | RESPIRATION RATE: 18 BRPM | OXYGEN SATURATION: 100 % | HEIGHT: 61 IN | HEART RATE: 71 BPM | WEIGHT: 202 LBS

## 2023-05-14 DIAGNOSIS — H57.13 EYE PAIN, BILATERAL: ICD-10-CM

## 2023-05-14 DIAGNOSIS — H57.89 EYE IRRITATION: ICD-10-CM

## 2023-05-14 DIAGNOSIS — H53.8 BLURRY VISION, LEFT EYE: ICD-10-CM

## 2023-05-14 DIAGNOSIS — H10.022 OTHER MUCOPURULENT CONJUNCTIVITIS OF LEFT EYE: Primary | ICD-10-CM

## 2023-05-14 LAB
ALBUMIN SERPL-MCNC: 3.8 G/DL (ref 3.5–5)
ALBUMIN/GLOB SERPL: 0.8 RATIO (ref 1.1–2)
ALP SERPL-CCNC: 119 UNIT/L (ref 40–150)
ALT SERPL-CCNC: 25 UNIT/L (ref 0–55)
AST SERPL-CCNC: 29 UNIT/L (ref 5–34)
BASOPHILS # BLD AUTO: 0.04 X10(3)/MCL
BASOPHILS NFR BLD AUTO: 0.7 %
BILIRUBIN DIRECT+TOT PNL SERPL-MCNC: 0.4 MG/DL
BUN SERPL-MCNC: 12.8 MG/DL (ref 9.8–20.1)
CALCIUM SERPL-MCNC: 9.9 MG/DL (ref 8.4–10.2)
CHLORIDE SERPL-SCNC: 108 MMOL/L (ref 98–107)
CO2 SERPL-SCNC: 24 MMOL/L (ref 22–29)
CREAT SERPL-MCNC: 0.83 MG/DL (ref 0.55–1.02)
EOSINOPHIL # BLD AUTO: 0.09 X10(3)/MCL (ref 0–0.9)
EOSINOPHIL NFR BLD AUTO: 1.6 %
ERYTHROCYTE [DISTWIDTH] IN BLOOD BY AUTOMATED COUNT: 14.2 % (ref 11.5–17)
GFR SERPLBLD CREATININE-BSD FMLA CKD-EPI: >60 MLS/MIN/1.73/M2
GLOBULIN SER-MCNC: 4.5 GM/DL (ref 2.4–3.5)
GLUCOSE SERPL-MCNC: 98 MG/DL (ref 74–100)
HCT VFR BLD AUTO: 50.1 % (ref 37–47)
HGB BLD-MCNC: 15.8 G/DL (ref 12–16)
IMM GRANULOCYTES # BLD AUTO: 0.03 X10(3)/MCL (ref 0–0.04)
IMM GRANULOCYTES NFR BLD AUTO: 0.5 %
LYMPHOCYTES # BLD AUTO: 2.36 X10(3)/MCL (ref 0.6–4.6)
LYMPHOCYTES NFR BLD AUTO: 42.8 %
MCH RBC QN AUTO: 26.8 PG (ref 27–31)
MCHC RBC AUTO-ENTMCNC: 31.5 G/DL (ref 33–36)
MCV RBC AUTO: 84.9 FL (ref 80–94)
MONOCYTES # BLD AUTO: 0.4 X10(3)/MCL (ref 0.1–1.3)
MONOCYTES NFR BLD AUTO: 7.2 %
NEUTROPHILS # BLD AUTO: 2.6 X10(3)/MCL (ref 2.1–9.2)
NEUTROPHILS NFR BLD AUTO: 47.2 %
NRBC BLD AUTO-RTO: 0 %
PLATELET # BLD AUTO: 252 X10(3)/MCL (ref 130–400)
PMV BLD AUTO: 11.2 FL (ref 7.4–10.4)
POTASSIUM SERPL-SCNC: 3 MMOL/L (ref 3.5–5.1)
PROT SERPL-MCNC: 8.3 GM/DL (ref 6.4–8.3)
RBC # BLD AUTO: 5.9 X10(6)/MCL (ref 4.2–5.4)
SODIUM SERPL-SCNC: 142 MMOL/L (ref 136–145)
WBC # SPEC AUTO: 5.52 X10(3)/MCL (ref 4.5–11.5)

## 2023-05-14 PROCEDURE — 96375 TX/PRO/DX INJ NEW DRUG ADDON: CPT

## 2023-05-14 PROCEDURE — 96365 THER/PROPH/DIAG IV INF INIT: CPT | Mod: 59

## 2023-05-14 PROCEDURE — 80053 COMPREHEN METABOLIC PANEL: CPT | Performed by: NURSE PRACTITIONER

## 2023-05-14 PROCEDURE — 63600175 PHARM REV CODE 636 W HCPCS: Performed by: STUDENT IN AN ORGANIZED HEALTH CARE EDUCATION/TRAINING PROGRAM

## 2023-05-14 PROCEDURE — 85025 COMPLETE CBC W/AUTO DIFF WBC: CPT | Performed by: NURSE PRACTITIONER

## 2023-05-14 PROCEDURE — 25000003 PHARM REV CODE 250: Performed by: STUDENT IN AN ORGANIZED HEALTH CARE EDUCATION/TRAINING PROGRAM

## 2023-05-14 PROCEDURE — 99285 EMERGENCY DEPT VISIT HI MDM: CPT | Mod: 25

## 2023-05-14 PROCEDURE — 25500020 PHARM REV CODE 255: Performed by: STUDENT IN AN ORGANIZED HEALTH CARE EDUCATION/TRAINING PROGRAM

## 2023-05-14 RX ORDER — CLINDAMYCIN HYDROCHLORIDE 150 MG/1
300 CAPSULE ORAL 4 TIMES DAILY
Qty: 56 CAPSULE | Refills: 0 | Status: SHIPPED | OUTPATIENT
Start: 2023-05-14 | End: 2023-05-21

## 2023-05-14 RX ORDER — METHYLPREDNISOLONE 4 MG/1
TABLET ORAL
Qty: 21 EACH | Refills: 0 | Status: SHIPPED | OUTPATIENT
Start: 2023-05-14 | End: 2023-06-04

## 2023-05-14 RX ORDER — DEXAMETHASONE SODIUM PHOSPHATE 4 MG/ML
8 INJECTION, SOLUTION INTRA-ARTICULAR; INTRALESIONAL; INTRAMUSCULAR; INTRAVENOUS; SOFT TISSUE
Status: COMPLETED | OUTPATIENT
Start: 2023-05-14 | End: 2023-05-14

## 2023-05-14 RX ORDER — PROPARACAINE HYDROCHLORIDE 5 MG/ML
1 SOLUTION/ DROPS OPHTHALMIC
Status: COMPLETED | OUTPATIENT
Start: 2023-05-14 | End: 2023-05-14

## 2023-05-14 RX ADMIN — IOPAMIDOL 50 ML: 755 INJECTION, SOLUTION INTRAVENOUS at 04:05

## 2023-05-14 RX ADMIN — FLUORESCEIN SODIUM 1 EACH: 1 STRIP OPHTHALMIC at 01:05

## 2023-05-14 RX ADMIN — VANCOMYCIN HYDROCHLORIDE 1250 MG: 1.25 INJECTION, POWDER, LYOPHILIZED, FOR SOLUTION INTRAVENOUS at 04:05

## 2023-05-14 RX ADMIN — DEXAMETHASONE SODIUM PHOSPHATE 8 MG: 4 INJECTION, SOLUTION INTRA-ARTICULAR; INTRALESIONAL; INTRAMUSCULAR; INTRAVENOUS; SOFT TISSUE at 04:05

## 2023-05-14 RX ADMIN — POTASSIUM BICARBONATE 20 MEQ: 391 TABLET, EFFERVESCENT ORAL at 04:05

## 2023-05-14 RX ADMIN — PROPARACAINE HYDROCHLORIDE 1 DROP: 5 SOLUTION/ DROPS OPHTHALMIC at 01:05

## 2023-05-14 NOTE — ED PROVIDER NOTES
Encounter Date: 2023       History     Chief Complaint   Patient presents with    Eye Problem     C/o bilateral eye pain and swelling onset Thursday, worsening today. States went to  Friday and given eye drops with no relief. Reports blurred vision in bilateral eyes but worse in left.      52-year-old history of strabismus surgery in [place] 5 years ago hypertension - presenting for bilateral eye redness swelling much worse on the left.  Seen in urgent care several days ago for left-sided eye pain and redness was diagnosed with chemosis started on prednisolone drops and ofloxacin drops.  Reports that her symptoms have gotten much worse in her left eye has also developed some redness in her right eye as well.  Associated photophobia. Now reports blurry vision in her left eye worsening periorbital swelling as well.  Some associated headaches denies fevers chills nausea vomiting.  Denies any trauma or injuries to her eyes.     The history is provided by the patient.   Review of patient's allergies indicates:   Allergen Reactions    Doxycycline     Latex     Doxycycline hyclate (bulk) Rash    Latex, natural rubber Rash    Pcn [penicillins] Rash    Sulfa (sulfonamide antibiotics) Rash     Past Medical History:   Diagnosis Date    Seizures      Past Surgical History:   Procedure Laterality Date     SECTION      x2    CYST REMOVAL      from back    STRABISMUS SURGERY Bilateral 2018    Procedure: STRABISMUS SURGERY;  Surgeon: MAC Canchola Jr., MD;  [facility]: [facility] OR [address] [place] [address];  Service: Ophthalmology;  Laterality: Bilateral;     No family history on file.  Social History     Tobacco Use    Smoking status: Every Day     Packs/day: 1.00     Types: Cigarettes    Smokeless tobacco: Never   Substance Use Topics    Alcohol use: No    Drug use: No     Review of Systems   Constitutional:  Negative for chills and fever.   HENT:  Negative for congestion, rhinorrhea and sore throat.    Eyes:  Positive  for photophobia, pain, discharge, redness and visual disturbance.   Respiratory:  Negative for cough and shortness of breath.    Cardiovascular:  Negative for chest pain and leg swelling.   Gastrointestinal:  Negative for abdominal pain, nausea and vomiting.   Genitourinary:  Negative for dysuria, hematuria, vaginal bleeding and vaginal discharge.   Musculoskeletal:  Negative for joint swelling.   Skin:  Negative for rash.   Neurological:  Negative for weakness.   Psychiatric/Behavioral:  Negative for confusion.      Physical Exam     Initial Vitals [05/14/23 1238]   BP Pulse Resp Temp SpO2   (!) 153/98 77 18 98.7 °F (37.1 °C) 98 %      MAP       --         Physical Exam    Nursing note and vitals reviewed.  Constitutional: She is not diaphoretic. No distress.   Eyes: Pupils are equal, round, and reactive to light. Right eye exhibits discharge. Left eye exhibits discharge.   Bilateral conjunctival injection and discharge  Purulent discharge to left eye with periorbital swelling, photophobia  Left eye chemosis   EOMI  Visual acuities OS 20/200, OD 20/30 (corrected)  No focal fluorescein uptake  IOP 23-25 OS and OD   No obvious foreign body    Cardiovascular:  Normal rate and regular rhythm.           No murmur heard.  Pulmonary/Chest: Breath sounds normal. No respiratory distress. She has no wheezes. She has no rales.   Abdominal: Abdomen is soft. She exhibits no distension. There is no abdominal tenderness.     Neurological: She is alert.   Psychiatric: She has a normal mood and affect.       ED Course   Procedures  Labs Reviewed   COMPREHENSIVE METABOLIC PANEL - Abnormal; Notable for the following components:       Result Value    Potassium Level 3.0 (*)     Chloride 108 (*)     Globulin 4.5 (*)     Albumin/Globulin Ratio 0.8 (*)     All other components within normal limits   CBC WITH DIFFERENTIAL - Abnormal; Notable for the following components:    RBC 5.90 (*)     Hct 50.1 (*)     MCH 26.8 (*)     MCHC 31.5 (*)      MPV 11.2 (*)     All other components within normal limits   CBC W/ AUTO DIFFERENTIAL    Narrative:     The following orders were created for panel order CBC Auto Differential.  Procedure                               Abnormality         Status                     ---------                               -----------         ------                     CBC with Differential[[id number]]        Abnormal            Final result                 Please view results for these tests on the individual orders.          Imaging Results              CT Orbits Sella Post Fossa With Contrast (Final result)  Result time 05/14/23 16:20:14      Final result by Nataly Hines MD (05/14/23 16:20:14)                   Impression:      No evidence of orbital cellulitis.      Electronically signed by: Nataly Hines  Date:    05/14/2023  Time:    16:20               Narrative:    EXAMINATION:  CT ORBITS SELLA POST FOSSA WITH CONTRAST    CLINICAL HISTORY:  Orbital cellulitis suspected;    TECHNIQUE:  Orbits CT performed with axial, sagittal and coronal reconstructions.  Iodinated contrast administered intravenously.    DLP: 275 mGycm    All CT scans at this location are performed using dose optimization techniques as appropriate to including automated exposure control, adjustment of the mA and/or kV according to patient size and/or use of iterative reconstruction technique    COMPARISON:  No relevant prior available for comparison.    FINDINGS:  BONES: No fracture.    SINUSES: Visualized paranasal sinuses are clear.    ORBITS: The globes are intact.  Normal appearance of the lens.  The retro bulbar fat is clear.  No postseptal inflammation.    DENTITION: No maxillary teeth.    SOFT TISSUES: Minimal periorbital soft tissue swelling.  No fluid collection or abscess.  Presumed reactive left cervical chain lymph nodes.    BRAIN: Visualized intracranial structures appear unremarkable.    MASTOIDS: Well aerated.                                        Medications   fluorescein ophthalmic strip 1 each (1 each Both Eyes Given 5/14/23 1348)   proparacaine 0.5 % ophthalmic solution 1 drop (1 drop Both Eyes Given 5/14/23 1348)   dexAMETHasone injection 8 mg (8 mg Intravenous Given 5/14/23 1614)   potassium bicarbonate disintegrating tablet 20 mEq (20 mEq Oral Given 5/14/23 1614)   iopamidoL (ISOVUE-370) injection 100 mL (50 mLs Intravenous Given 5/14/23 1605)               Medical Decision Making  Problems Addressed:  Eye pain, bilateral: acute illness or injury  Other mucopurulent conjunctivitis of left eye: acute illness or injury      ED assessment:    51 yo with bilateral atraumatic eye pain/redness/discharge much worse on left  Initially all symptoms on L, put on prednisolone and ofloxacin drops   Symptoms in L eye got much worse, now having blurry vision - visual acuities as above  Rest of ocular exam as above  Discussed with ophthalmology Dr Meza - recommendations in ED course, will obtain CT orbits as recommended, if without orbital cellulitis or other abnormalities can follow up in clinic tomorrow     Differential diagnosis (including but not limited to):   Acute glaucoma, retinal detachment, vitreous hemorrhage, retinal artery occlusion, corneal abrasion, corneal ulceration, herpes ophthalmicus, hordeolum/chalazion, foreign body, pterygium, conjunctivitis, subconjunctival hemorrhage, periorbital cellulitis, orbital cellulitis, keratitis, uveitis/iritis, episcleritis, endophthalmitis, optic neuritis    ED management:   IV vanc  IV decadron  Prescription management   Discuss with ophthalmology Dr. Meza       Amount and/or Complexity of Data Reviewed  Independent historian: none  External data reviewed: previous procedure and OR notes  Summary of data reviewed: bilateral strabismus surgery 5 years ago   Risk and benefits of testing: discussed   Labs: ordered and reviewed  Radiology: ordered and independent interpretation performed (see  above or ED course)  Discussion of management or test interpretation with external provider(s): discussed with ophthalmology Dr. Meza consultant   Summary of discussion: see ED course     Risk  Prescription drug management     Critical Care  none    I, Bradley Bundy MD personally performed the history, PE, MDM, and procedures as documented above and agree with the scribe's documentation.         ED Course as of 05/14/23 7363   Sun May 14, 2023   1435 Dr. Meza with ophthalmology paged, aware of clinical exam and visual acuities,  recommends medrol dose kerri, steroids/vancomycin in ED. She recommends discontinuing the ofloxacin ggt, starting augmentin PO, she will see in clinic at 3 pm tomorrow.   Also recommends CT to r/u orbital cellulitis, abscess  [AC]   1449 Allergic to penicillins - will substitute clindamycin  [AC]   1628 CT Orbits Sella Post Fossa With Contrast  I assumed care of the patient at 1500 at shift change with plan to follow up CT and reassess patient. Plan per previous ER physician in discussion with Ophtho to discharge home on antibiotics, steroid drops. Previous ER physician has secured follow up with Ophthalmology for 1500 tomorrow on 5/15 in clinic for further evaluation and management. CT orbits negative for any acute abnormality. IV abx infusing. [MC]      ED Course User Index  [AC] Bradley Bundy IV, MD  [MC] Eddie eSgura MD                   Clinical Impression:   Final diagnoses:  [H57.13] Eye pain, bilateral  [H10.022] Other mucopurulent conjunctivitis of left eye (Primary)  [H57.89] Eye irritation  [H53.8] Blurry vision, left eye        ED Disposition Condition    Discharge Stable          ED Prescriptions       Medication Sig Dispense Start Date End Date Auth. Provider    clindamycin (CLEOCIN) 150 MG capsule Take 2 capsules (300 mg total) by mouth 4 (four) times daily. for 7 days 56 capsule 5/14/2023 5/21/2023 Bradley Bundy IV, MD    methylPREDNISolone (MEDROL DOSEPACK) 4 mg  tablet use as directed 21 each 5/14/2023 6/4/2023 Bradley Bundy IV, MD          Follow-up Information       Follow up With Specialties Details Why Contact Info    Treva Meza MD Ophthalmology Go in 1 day Go to appointment at 3 pm [address] [facility]  [facility] [id number]  [phone]      [facility] - Emergency Dept Emergency Medicine  If symptoms worsen [address] [facility] [id number] [phone]             Bradley Bundy IV, MD  05/15/23 0007

## 2023-05-14 NOTE — DISCHARGE INSTRUCTIONS
Stop using ofloxacin drops, keep using steroid drops   Take other medications as prescribed     Thanks for letting use take care of you today! It is our goal to give you courteous care and to keep you comfortable and informed. If you have any questions before you leave I will be happy to try and answer them.     Advice after your visit:  Your visit in the emergency department is NOT definitive care - please follow-up with your primary care doctor and/or specialist within 1-2 days. If you do not have a primary care physician call [phone] to schedule an appointment. Please return if you have any worsening in your condition or if you have any other concerns.    Return to the emergency department if any worsening symptoms including fever, chest pain, difficulty breathing, weakness, numbness, tingling, nausea, vomiting, inability to eat, drink or take your medication, or any other new symptoms or concerns arise.      Please signup for MyChart as noted below in your paperwork to review all labwork, imaging results, and any other incidental findings from today's visit.     If you had radiology exams like an XRAY or CT in the emergency Department the interpreation on them may be preliminary - there may be less time sensitive findings on the reports please obtain these reports within 24 hours from the hospital or by using your out on your mobile phone to access records.  Bring these to your primary care doctor and/or specialist for further review of incidental findings.    Please review any LAB WORK from your visit today with your primary care physician.    If you were prescribed OPIATE PAIN MEDICATION - please understand of these medications can be addictive, you may fill less of the prescription was written for, you do not have to take the full prescription.  You may discard what you do not use.  Please seek help if you feel you are having problems with addiction.  Do not drive or operate heavy machinery if you are  taking sedating medications.  Do not mix these medications with alcohol.      If you had a SPLINT placed in the emergency department if you have severe pain numbness tingling or discoloration of year digits please remove the splint and return to the emergency department for further evaluation as this may represent a sign of compromise to the nerves or blood vessels due to swelling.    If you had SUTURES in the emergency department please have them removed in the prescribed time frame typically within 7-14 days.  You may shower but please do not bathe or swim.  Keep the wounds clean and dry and covered with a clean dressing.  Please return if he have any signs of infection like redness or drainage or pain at the suture site.    Please take the full course of  any ANTIBIOTICS you were prescribed - incomplete courses of antibiotics can cause resistance to antibiotics in the future which will make it difficult to treat any infections you may have.

## 2023-05-14 NOTE — FIRST PROVIDER EVALUATION
Medical screening examination initiated.  I have conducted a focused provider triage encounter, findings are as follows:    Brief history of present illness:  Patient states bilateral eye redness, drainage, eye pain, and swelling x several days. States that she started on eye drops with no improvement.      Vitals:    05/14/23 1238   BP: (!) 153/98   Pulse: 77   Resp: 18   Temp: 98.7 °F (37.1 °C)   TempSrc: Oral   SpO2: 98%       Pertinent physical exam:  Awake, alert, ambulatory      Brief workup plan:  Labs, Exam    Preliminary workup initiated; this workup will be continued and followed by the physician or advanced practice provider that is assigned to the patient when roomed.

## 2023-05-14 NOTE — PROGRESS NOTES
Pharmacokinetic Initial Assessment: IV Vancomycin    Assessment/Plan:    Initiate intravenous vancomycin with loading dose of 1250 mg once followed by a maintenance dose of vancomycin 1250 mg IV every 12 hours  Desired empiric serum trough concentration is 15 to 20 mcg/mL  Draw vancomycin trough level 60 min prior to fifth dose on 05/16 at approximately 1400  Pharmacy will continue to follow and monitor vancomycin.      Please contact pharmacy at extension [phone] with any questions regarding this assessment.     Thank you for the consult,   Hoang Irene       Patient brief summary:  Erlinda Alexander is a 52 y.o. female initiated on antimicrobial therapy with IV Vancomycin for treatment of suspected  ocular infection    Drug Allergies:   Review of patient's allergies indicates:   Allergen Reactions    Doxycycline     Latex     Doxycycline hyclate (bulk) Rash    Latex, natural rubber Rash    Pcn [penicillins] Rash    Sulfa (sulfonamide antibiotics) Rash       Actual Body Weight:   92 kg    Renal Function:   Estimated Creatinine Clearance: 82.7 mL/min (based on SCr of 0.83 mg/dL).,     Dialysis Method (if applicable):  N/A    CBC (last 72 hours):  Recent Labs   Lab Result Units 05/14/23  1326   WBC x10(3)/mcL 5.52   Hgb g/dL 15.8   Hct % 50.1*   Platelet x10(3)/mcL 252   Mono % % 7.2   Eos % % 1.6   Basophil % % 0.7       Metabolic Panel (last 72 hours):  Recent Labs   Lab Result Units 05/14/23  1326   Sodium Level mmol/L 142   Potassium Level mmol/L 3.0*   Chloride mmol/L 108*   Carbon Dioxide mmol/L 24   Glucose Level mg/dL 98   Blood Urea Nitrogen mg/dL 12.8   Creatinine mg/dL 0.83   Albumin Level g/dL 3.8   Bilirubin Total mg/dL 0.4   Alkaline Phosphatase unit/L 119   Aspartate Aminotransferase unit/L 29   Alanine Aminotransferase unit/L 25       Drug levels (last 3 results):  No results for input(s): VANCOMYCINRA, VANCORANDOM, VANCOMYCINPE, VANCOPEAK, VANCOMYCINTR, VANCOTROUGH in the last 72  hours.    Microbiologic Results:  Microbiology Results (last 7 days)       ** No results found for the last 168 hours. **

## 2023-08-19 ENCOUNTER — HOSPITAL ENCOUNTER (EMERGENCY)
Facility: HOSPITAL | Age: 53
Discharge: HOME | End: 2023-08-19
Payer: COMMERCIAL

## 2023-08-19 VITALS
SYSTOLIC BLOOD PRESSURE: 116 MMHG | BODY MASS INDEX: 39.46 KG/M2 | OXYGEN SATURATION: 100 % | HEIGHT: 61 IN | WEIGHT: 209 LBS | RESPIRATION RATE: 16 BRPM | TEMPERATURE: 97 F | HEART RATE: 65 BPM | DIASTOLIC BLOOD PRESSURE: 74 MMHG

## 2023-08-19 DIAGNOSIS — S96.912A ANKLE STRAIN, LEFT, INITIAL ENCOUNTER: Primary | ICD-10-CM
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
## 2023-08-19 PROCEDURE — 99283 EMERGENCY DEPT VISIT LOW MDM: CPT

## 2023-08-19 RX ORDER — IBUPROFEN 800 MG/1
800 TABLET ORAL EVERY 6 HOURS PRN
Qty: 20 TABLET | Refills: 0 | OUTPATIENT
Start: 2023-08-19 | End: 2023-12-10

## 2023-08-19 NOTE — DISCHARGE INSTRUCTIONS
Ice and elevated for the next 24-48 hours to help alleviate pain and swelling. Advance activity as tolerated. If not better follow with PCP for repeat imaging studies if needed.

## 2023-08-19 NOTE — FIRST PROVIDER EVALUATION
Medical screening examination initiated.  I have conducted a focused provider triage encounter, findings are as follows:    Brief history of present illness:  51y/o F presents to the ED with left foot/ankle  pain. States stepped into a hole.     There were no vitals filed for this visit.    Pertinent physical exam:  AAA x 3    Brief workup plan:  Imaging. /meds    Preliminary workup initiated; this workup will be continued and followed by the physician or advanced practice provider that is assigned to the patient when roomed.

## 2023-08-19 NOTE — Clinical Note
"Erlinda"Erlinda" [facility] was seen and treated in our emergency department on 8/19/2023.  She may return to work on 08/23/2023.       If you have any questions or concerns, please don't hesitate to call.      Demetria Tamez, ISAACP"

## 2023-08-19 NOTE — ED NOTES
Discharge instructions, return precautions, follow up information provided to pt. Pt verbalizes understanding. All questions answered. Pt is GCS 15, equal unlabored respirations. Pt ambulated to exit using crutches.

## 2023-08-19 NOTE — ED PROVIDER NOTES
Encounter Date: 2023       History     Chief Complaint   Patient presents with    Foot Pain     POV, ambulatory, GCS 15. Reports stepped in a hole while walking this AM and has left foot pain. Denies fall. Reports swelling to left medial ankle. Took tramadol for pain this AM for previous injury.      53y/o F with medical history of HTN. She presents to the ED with left foot/ankle pain after falling into a hole at work. States prior to the fall today she fell into the same hole with same foot she is complaining of pain today.     The history is provided by the patient.   Foot Injury   The incident occurred at work. The injury mechanism was a fall. The incident occurred just prior to arrival. The pain is present in the left foot and left ankle. The quality of the pain is described as throbbing and aching. The pain is at a severity of 8/10. The pain has been Constant since onset. Associated symptoms include inability to bear weight. The symptoms are aggravated by bearing weight and palpation. Treatments tried: Tramdol.     Review of patient's allergies indicates:   Allergen Reactions    Doxycycline     Latex     Doxycycline hyclate (bulk) Rash    Latex, natural rubber Rash    Pcn [penicillins] Rash    Sulfa (sulfonamide antibiotics) Rash     Past Medical History:   Diagnosis Date    Seizures      Past Surgical History:   Procedure Laterality Date     SECTION      x2    CYST REMOVAL      from back    STRABISMUS SURGERY Bilateral 2018    Procedure: STRABISMUS SURGERY;  Surgeon: MAC Canchola Jr., MD;  Location: [facility] OR [address] [place] [address];  Service: Ophthalmology;  Laterality: Bilateral;     No family history on file.  Social History     Tobacco Use    Smoking status: Every Day     Current packs/day: 1.00     Types: Cigarettes    Smokeless tobacco: Never   Substance Use Topics    Alcohol use: No    Drug use: No     Review of Systems   Musculoskeletal:  Positive for joint swelling.   All other systems  reviewed and are negative.      Physical Exam     Initial Vitals [08/19/23 1308]   BP Pulse Resp Temp SpO2   123/79 82 16 97.3 °F (36.3 °C) 98 %      MAP       --         Physical Exam    Constitutional: She appears well-developed and well-nourished.   HENT:   Head: Normocephalic and atraumatic.   Eyes: EOM are normal. Pupils are equal, round, and reactive to light.   Neck: Neck supple.   Normal range of motion.  Cardiovascular:  Normal rate, regular rhythm and normal heart sounds.           Pulmonary/Chest: Breath sounds normal.   Abdominal: Abdomen is soft.   Musculoskeletal:      Cervical back: Normal range of motion and neck supple.      Left ankle: Swelling present. Tenderness present. Decreased range of motion.        Legs:       Comments: + pedal pulse. Swelling noted to medial aspect of left ankle. All other adjacent  joints WNL.            ED Course   Procedures  Labs Reviewed - No data to display       Imaging Results              X-Ray Foot Complete Left (Final result)  Result time 08/19/23 14:05:26      Final result by Luis Head MD (08/19/23 14:05:26)                   Narrative:    EXAMINATION  XR FOOT COMPLETE 3 VIEW LEFT    CLINICAL HISTORY  Unspecified fall, initial encounter    TECHNIQUE  A total of 3 images submitted of the left foot.    COMPARISON  None available at the time of initial interpretation.    FINDINGS  No displaced fracture or dislocation is identified. The visualized joint spaces are preserved. No aggressive osseous lesion or periosteal reaction is evident.    The included soft tissues are without acute abnormality.    IMPRESSION  No convincing radiographic abnormality.      Electronically signed by: Luis Head  Date:    08/19/2023  Time:    14:05                                     X-Ray Ankle Complete Left (Final result)  Result time 08/19/23 14:06:50      Final result by Luis Head MD (08/19/23 14:06:50)                   Narrative:    EXAMINATION  XR ANKLE COMPLETE  3 VIEW LEFT    CLINICAL HISTORY  Unspecified fall, initial encounter    TECHNIQUE  A total of 3 images submitted of the left ankle.    COMPARISON  None available at the time of initial interpretation.    FINDINGS  No displaced fracture or dislocation is identified. The visualized joint spaces are preserved and there are no findings indicative of a joint effusion. No aggressive osseous lesion or periosteal reaction is identified.    Periarticular soft tissue swelling is present.    IMPRESSION  Regional soft tissue swelling without convincing acute osseous displacement.      Electronically signed by: Luis Head  Date:    08/19/2023  Time:    14:06                                     Medications - No data to display  Medical Decision Making  Amount and/or Complexity of Data Reviewed  Radiology: ordered.      Additional MDM:   Differential Diagnosis:   Other: The following diagnoses were also considered and will be evaluated: ankle fracture, ankle strain.                            Clinical Impression:   Final diagnoses:  [W19.XXXA] Fall               Demetria Tamez, FNP  08/19/23 1643

## 2023-12-10 ENCOUNTER — HOSPITAL ENCOUNTER (EMERGENCY)
Facility: HOSPITAL | Age: 53
Discharge: HOME | End: 2023-12-10
Attending: EMERGENCY MEDICINE
Payer: COMMERCIAL

## 2023-12-10 VITALS
HEIGHT: 61 IN | WEIGHT: 205 LBS | TEMPERATURE: 97 F | OXYGEN SATURATION: 100 % | BODY MASS INDEX: 38.71 KG/M2 | DIASTOLIC BLOOD PRESSURE: 92 MMHG | SYSTOLIC BLOOD PRESSURE: 156 MMHG | HEART RATE: 91 BPM | RESPIRATION RATE: 20 BRPM

## 2023-12-10 DIAGNOSIS — M25.572 CHRONIC PAIN OF LEFT ANKLE: ICD-10-CM

## 2023-12-10 DIAGNOSIS — M25.511 RIGHT SHOULDER PAIN, UNSPECIFIED CHRONICITY: Primary | ICD-10-CM

## 2023-12-10 DIAGNOSIS — G89.29 CHRONIC PAIN OF LEFT ANKLE: ICD-10-CM

## 2023-12-10 PROCEDURE — 99284 EMERGENCY DEPT VISIT MOD MDM: CPT

## 2023-12-10 PROCEDURE — 25000003 PHARM REV CODE 250: Performed by: NURSE PRACTITIONER

## 2023-12-10 RX ORDER — INDOMETHACIN 25 MG/1
25 CAPSULE ORAL 2 TIMES DAILY PRN
Qty: 14 CAPSULE | Refills: 0 | Status: SHIPPED | OUTPATIENT
Start: 2023-12-10

## 2023-12-10 RX ORDER — TRAMADOL HYDROCHLORIDE 50 MG/1
50 TABLET ORAL EVERY 12 HOURS PRN
Qty: 10 TABLET | Refills: 0 | Status: SHIPPED | OUTPATIENT
Start: 2023-12-10 | End: 2023-12-15

## 2023-12-10 RX ORDER — GABAPENTIN 300 MG/1
300 CAPSULE ORAL DAILY
Qty: 30 CAPSULE | Refills: 0 | Status: SHIPPED | OUTPATIENT
Start: 2023-12-10 | End: 2024-01-09

## 2023-12-10 RX ORDER — KETOROLAC TROMETHAMINE 10 MG/1
10 TABLET, FILM COATED ORAL
Status: COMPLETED | OUTPATIENT
Start: 2023-12-10 | End: 2023-12-10

## 2023-12-10 RX ORDER — BACLOFEN 10 MG/1
10 TABLET ORAL 3 TIMES DAILY PRN
Qty: 21 TABLET | Refills: 0 | Status: SHIPPED | OUTPATIENT
Start: 2023-12-10

## 2023-12-10 RX ADMIN — KETOROLAC TROMETHAMINE 10 MG: 10 TABLET, FILM COATED ORAL at 04:12

## 2023-12-10 NOTE — DISCHARGE INSTRUCTIONS
Follow up with [facility] as discussed to obtain a PCP.  Warm compresses to affected areas and soak in Epsom Salt for comfort.  Take pain medication as directed for up to 7 days.

## 2023-12-10 NOTE — ED PROVIDER NOTES
"Encounter Date: 12/10/2023       History     Chief Complaint   Patient presents with    Shoulder Pain    Foot Pain     C/o bilateral shoulder pain and left foot pain     Pt is a 53 y.o. female who presents to the [facility] ED complaining of pain to her Rt shoulder and Lt foot. Pt reports being diagnosed with a "rotator cuff injury" in affected shoulder however he PCP retired and she does not have a physician to follow up with. Requesting a clinic referral. Pt also reports pain to her Lt foot. Walking boot in place. Reports persistent pain to ankle and foot since "spraining area" in August. Seen at [facility] ED for issue and reports her PCP was planning to perform a MRI prior to his half-way. Denies new injury to extremity, redness to extremity, or loss of sensation/mobility to extremity. Admits to being on tramadol daily for issue but is currently out.      Review of patient's allergies indicates:   Allergen Reactions    Doxycycline     Latex     Doxycycline hyclate (bulk) Rash    Latex, natural rubber Rash    Pcn [penicillins] Rash    Sulfa (sulfonamide antibiotics) Rash     Past Medical History:   Diagnosis Date    Seizures      Past Surgical History:   Procedure Laterality Date     SECTION      x2    CYST REMOVAL      from back    STRABISMUS SURGERY Bilateral 2018    Procedure: STRABISMUS SURGERY;  Surgeon: MAC Canchola Jr., MD;  Location: [facility] OR [address] [place] [address];  Service: Ophthalmology;  Laterality: Bilateral;     History reviewed. No pertinent family history.  Social History     Tobacco Use    Smoking status: Every Day     Current packs/day: 1.00     Types: Cigarettes    Smokeless tobacco: Never   Substance Use Topics    Alcohol use: No    Drug use: No     Review of Systems   Constitutional:  Negative for chills, diaphoresis, fatigue and fever.   HENT:  Negative for facial swelling, rhinorrhea, sinus pressure, sinus pain, sore throat and trouble swallowing.    Respiratory:  Negative for cough, chest " tightness, shortness of breath and wheezing.    Cardiovascular:  Negative for chest pain, palpitations and leg swelling.   Gastrointestinal:  Negative for abdominal pain, diarrhea, nausea and vomiting.   Genitourinary:  Negative for dysuria, flank pain, frequency, hematuria and urgency.   Musculoskeletal:  Positive for arthralgias. Negative for back pain, joint swelling and myalgias.   Skin:  Negative for color change and rash.   Neurological:  Negative for dizziness, syncope, weakness and light-headedness.   Hematological:  Does not bruise/bleed easily.   All other systems reviewed and are negative.      Physical Exam     Initial Vitals [12/10/23 1516]   BP Pulse Resp Temp SpO2   (!) 156/92 91 20 97.2 °F (36.2 °C) 100 %      MAP       --         Physical Exam    Nursing note and vitals reviewed.  Constitutional: She appears well-developed and well-nourished.   HENT:   Head: Normocephalic and atraumatic.   Nose: Nose normal.   Mouth/Throat: Oropharynx is clear and moist.   Eyes: Conjunctivae and EOM are normal. Pupils are equal, round, and reactive to light.   Neck: Neck supple.   Normal range of motion.  Cardiovascular:  Normal rate, regular rhythm, normal heart sounds and intact distal pulses.           Pulmonary/Chest: Effort normal and breath sounds normal. No respiratory distress. She has no wheezes. She has no rhonchi. She has no rales. She exhibits no tenderness.   Abdominal: Abdomen is soft and flat. Bowel sounds are normal. She exhibits no distension. There is no abdominal tenderness. There is no rebound, no guarding, no tenderness at McBurney's point and negative Ambrose's sign. negative psoas sign  Musculoskeletal:         General: Normal range of motion.      Right shoulder: Tenderness present. No swelling or effusion. Normal range of motion. Normal strength. Normal pulse.        Arms:       Cervical back: Normal range of motion and neck supple.      Left ankle: No swelling, deformity or ecchymosis.  Tenderness present. Normal range of motion. Normal pulse.        Legs:       Comments: Walking boot in place.     Neurological: She is alert and oriented to person, place, and time. She has normal strength and normal reflexes.   Skin: Skin is warm and dry. Capillary refill takes less than 2 seconds.   Psychiatric: She has a normal mood and affect. Her speech is normal and behavior is normal. Judgment and thought content normal.         ED Course   Procedures  Labs Reviewed - No data to display       Imaging Results    None          Medications   ketorolac tablet 10 mg (has no administration in time range)     Medical Decision Making  Differential:  Arthralgia  Encounter to establish care  History of rotator cuff injury  Muscle strain  Chronic pain    Amount and/or Complexity of Data Reviewed  External Data Reviewed: radiology.     Details: X-Ray Ankle Complete Left  Order: [id number]  Narrative    Left ankle, AP/oblique/lateral views    Indication: 52-year-old woman with left ankle pain post injury.    Findings:    There is prominent circumferential soft tissue swelling at the left ankle without more focal soft tissue abnormality. Alignment is anatomic without acute or chronic fracture. The joint spaces are normal and there is no osseous or osteochondral lesion. Incidental mild chronic enthesopathic change at the calcaneal insertion of the plantar fascia.    Impression:    Moderate soft tissue swelling at the left ankle without bony abnormality.  Exam End: 09/09/23 15:12 Last Resulted: 09/09/23 15:20  [facility]  Result Received: 12/10/23 15:08                   ED Course as of 12/10/23 1619   Sun Dec 10, 2023   1616 Given strict ED return precautions. I have spoken with the patient and/or caregivers. I have explained the patient's condition, diagnoses and treatment plan based on the information available to me at this time. I  have answered the patient's and/or caregiver's questions and addressed any concerns. The patient and/or caregivers have as good an understanding of the patient's diagnosis, condition and treatment plan as can be expected at this point. The vital signs have been stable. The patient's condition is stable and appropriate for discharge from the emergency department.      The patient will pursue further outpatient evaluation with the primary care physician or other designated or consulting physician as outlined in the discharge instructions. The patient and/or caregivers are agreeable to this plan of care and follow-up instructions have been explained in detail. The patient and/or caregivers have received these instructions in written format and have expressed an understanding of the discharge instructions. The patient and/or caregivers are aware that any significant change in condition or worsening of symptoms should prompt an immediate return to this or the closest emergency department or a call to 911.   [JA]      ED Course User Index  [JA] Roberto Tay Jr., FNP                           Clinical Impression:  Final diagnoses:  [M25.511] Right shoulder pain, unspecified chronicity (Primary)  [M25.572, G89.29] Chronic pain of left ankle          ED Disposition Condition    Discharge Stable          ED Prescriptions       Medication Sig Dispense Start Date End Date Auth. Provider    indomethacin (INDOCIN) 25 MG capsule Take 1 capsule (25 mg total) by mouth 2 (two) times daily as needed (pain). 14 capsule 12/10/2023 -- Roberto Tay Jr., FNP    baclofen (LIORESAL) 10 MG tablet Take 1 tablet (10 mg total) by mouth 3 (three) times daily as needed (muscle spasms). 21 tablet 12/10/2023 -- Roberto Tay Jr., FNP    gabapentin (NEURONTIN) 300 MG capsule Take 1 capsule (300 mg total) by mouth once daily. 30 capsule 12/10/2023 1/9/2024 Roberto Tay Jr., FNP          Follow-up Information       Follow up With  Specialties Details Why Contact Info    [facility] - Emergency Dept Emergency Medicine In 3 days As needed, If symptoms worsen [address] [facility] [id number] [phone]    [facility]  Schedule an appointment as soon as possible for a visit in 1 week Follow up with [facility] to obtain a PCP [address] [facility] [address]             Roberto Tay Jr., [facility]  12/10/23 8726

## 2024-01-05 ENCOUNTER — TELEPHONE (OUTPATIENT)
Dept: INTERNAL MEDICINE | Facility: CLINIC | Age: 54
End: 2024-01-05

## 2024-03-21 ENCOUNTER — HOSPITAL ENCOUNTER (EMERGENCY)
Facility: HOSPITAL | Age: 54
Discharge: LEFT BEFORE DISCHARGE | End: 2024-03-22
Payer: COMMERCIAL

## 2024-03-21 VITALS
WEIGHT: 185 LBS | BODY MASS INDEX: 34.93 KG/M2 | RESPIRATION RATE: 19 BRPM | OXYGEN SATURATION: 100 % | HEART RATE: 81 BPM | TEMPERATURE: 98 F | DIASTOLIC BLOOD PRESSURE: 101 MMHG | SYSTOLIC BLOOD PRESSURE: 176 MMHG | HEIGHT: 61 IN

## 2024-03-21 LAB
ALBUMIN SERPL-MCNC: 3.9 G/DL (ref 3.5–5)
ALBUMIN/GLOB SERPL: 1 RATIO (ref 1.1–2)
ALP SERPL-CCNC: 106 UNIT/L (ref 40–150)
ALT SERPL-CCNC: 28 UNIT/L (ref 0–55)
APPEARANCE UR: CLEAR
AST SERPL-CCNC: 32 UNIT/L (ref 5–34)
BACTERIA #/AREA URNS AUTO: ABNORMAL /HPF
BASOPHILS # BLD AUTO: 0.04 X10(3)/MCL
BASOPHILS NFR BLD AUTO: 0.5 %
BILIRUB SERPL-MCNC: 0.4 MG/DL
BILIRUB UR QL STRIP.AUTO: NEGATIVE
BUN SERPL-MCNC: 9.6 MG/DL (ref 9.8–20.1)
CALCIUM SERPL-MCNC: 9.7 MG/DL (ref 8.4–10.2)
CAOX CRY URNS QL MICRO: ABNORMAL /HPF
CHLORIDE SERPL-SCNC: 109 MMOL/L (ref 98–107)
CO2 SERPL-SCNC: 22 MMOL/L (ref 22–29)
COLOR UR AUTO: YELLOW
CREAT SERPL-MCNC: 0.74 MG/DL (ref 0.55–1.02)
EOSINOPHIL # BLD AUTO: 0.17 X10(3)/MCL (ref 0–0.9)
EOSINOPHIL NFR BLD AUTO: 2 %
ERYTHROCYTE [DISTWIDTH] IN BLOOD BY AUTOMATED COUNT: 14.3 % (ref 11.5–17)
GFR SERPLBLD CREATININE-BSD FMLA CKD-EPI: >60 MLS/MIN/1.73/M2
GLOBULIN SER-MCNC: 3.9 GM/DL (ref 2.4–3.5)
GLUCOSE SERPL-MCNC: 78 MG/DL (ref 74–100)
GLUCOSE UR QL STRIP.AUTO: NORMAL
HCT VFR BLD AUTO: 48 % (ref 37–47)
HGB BLD-MCNC: 14.8 G/DL (ref 12–16)
IMM GRANULOCYTES # BLD AUTO: 0.05 X10(3)/MCL (ref 0–0.04)
IMM GRANULOCYTES NFR BLD AUTO: 0.6 %
KETONES UR QL STRIP.AUTO: NEGATIVE
LEUKOCYTE ESTERASE UR QL STRIP.AUTO: NEGATIVE
LYMPHOCYTES # BLD AUTO: 3.02 X10(3)/MCL (ref 0.6–4.6)
LYMPHOCYTES NFR BLD AUTO: 35.7 %
MCH RBC QN AUTO: 27 PG (ref 27–31)
MCHC RBC AUTO-ENTMCNC: 30.8 G/DL (ref 33–36)
MCV RBC AUTO: 87.4 FL (ref 80–94)
MONOCYTES # BLD AUTO: 0.5 X10(3)/MCL (ref 0.1–1.3)
MONOCYTES NFR BLD AUTO: 5.9 %
MUCOUS THREADS URNS QL MICRO: ABNORMAL /LPF
NEUTROPHILS # BLD AUTO: 4.67 X10(3)/MCL (ref 2.1–9.2)
NEUTROPHILS NFR BLD AUTO: 55.3 %
NITRITE UR QL STRIP.AUTO: NEGATIVE
NRBC BLD AUTO-RTO: 0 %
PH UR STRIP.AUTO: 6.5 [PH]
PLATELET # BLD AUTO: 248 X10(3)/MCL (ref 130–400)
PMV BLD AUTO: 11.8 FL (ref 7.4–10.4)
POTASSIUM SERPL-SCNC: 3.7 MMOL/L (ref 3.5–5.1)
PROT SERPL-MCNC: 7.8 GM/DL (ref 6.4–8.3)
PROT UR QL STRIP.AUTO: NEGATIVE
RBC # BLD AUTO: 5.49 X10(6)/MCL (ref 4.2–5.4)
RBC #/AREA URNS AUTO: ABNORMAL /HPF
RBC UR QL AUTO: NEGATIVE
SODIUM SERPL-SCNC: 142 MMOL/L (ref 136–145)
SP GR UR STRIP.AUTO: 1.02 (ref 1–1.03)
SQUAMOUS #/AREA URNS LPF: ABNORMAL /HPF
UROBILINOGEN UR STRIP-ACNC: 2
WBC # SPEC AUTO: 8.45 X10(3)/MCL (ref 4.5–11.5)
WBC #/AREA URNS AUTO: ABNORMAL /HPF

## 2024-03-21 PROCEDURE — 99283 EMERGENCY DEPT VISIT LOW MDM: CPT

## 2024-03-21 PROCEDURE — 85025 COMPLETE CBC W/AUTO DIFF WBC: CPT | Performed by: PHYSICIAN ASSISTANT

## 2024-03-21 PROCEDURE — 81001 URINALYSIS AUTO W/SCOPE: CPT | Performed by: PHYSICIAN ASSISTANT

## 2024-03-21 PROCEDURE — 80053 COMPREHEN METABOLIC PANEL: CPT | Performed by: PHYSICIAN ASSISTANT

## 2024-03-22 NOTE — FIRST PROVIDER EVALUATION
"Medical screening examination initiated.  I have conducted a focused provider triage encounter, findings are as follows:    Chief Complaint   Patient presents with    Abdominal Pain     Periumbilical pain that radiates to the RLQ that pt describes as "tenderness" x3 days. Denies n/v, fever, dysuria. PMH: Cholecystectomy and HTN     Brief history of present illness:  53 y.o. female presents to the ED with worsening periumbilical pain radiating to RLQ onset 2 days ago. Denies n/v, fever, chills, dysuria    Vitals:    03/21/24 1910   BP: (!) 176/101   Pulse: 81   Resp: 19   Temp: 97.8 °F (36.6 °C)   TempSrc: Oral   SpO2: 100%   Weight: 83.9 kg (185 lb)   Height: 5' 1" (1.549 m)       Pertinent physical exam:  Awake, alert, ambulatory, non-labored respirations, HTN (took meds)    Brief workup plan:  labs, UA    Preliminary workup initiated; this workup will be continued and followed by the physician or advanced practice provider that is assigned to the patient when roomed.  "

## 2024-03-29 ENCOUNTER — HOSPITAL ENCOUNTER (EMERGENCY)
Facility: HOSPITAL | Age: 54
Discharge: HOME | End: 2024-03-29
Attending: EMERGENCY MEDICINE
Payer: COMMERCIAL

## 2024-03-29 VITALS
SYSTOLIC BLOOD PRESSURE: 151 MMHG | HEART RATE: 68 BPM | RESPIRATION RATE: 18 BRPM | DIASTOLIC BLOOD PRESSURE: 92 MMHG | WEIGHT: 190 LBS | BODY MASS INDEX: 35.87 KG/M2 | OXYGEN SATURATION: 100 % | HEIGHT: 61 IN | TEMPERATURE: 98 F

## 2024-03-29 DIAGNOSIS — I10 BENIGN ESSENTIAL HTN: ICD-10-CM

## 2024-03-29 DIAGNOSIS — R10.10 UPPER ABDOMINAL PAIN: Primary | ICD-10-CM

## 2024-03-29 DIAGNOSIS — R05.9 COUGH: ICD-10-CM

## 2024-03-29 DIAGNOSIS — J06.9 VIRAL URI WITH COUGH: ICD-10-CM

## 2024-03-29 DIAGNOSIS — R10.13 EPIGASTRIC ABDOMINAL PAIN: ICD-10-CM

## 2024-03-29 LAB
ALBUMIN SERPL-MCNC: 3.2 G/DL (ref 3.5–5)
ALBUMIN/GLOB SERPL: 0.8 RATIO (ref 1.1–2)
ALP SERPL-CCNC: 92 UNIT/L (ref 40–150)
ALT SERPL-CCNC: 19 UNIT/L (ref 0–55)
APPEARANCE UR: CLEAR
AST SERPL-CCNC: 24 UNIT/L (ref 5–34)
BACTERIA #/AREA URNS AUTO: ABNORMAL /HPF
BASOPHILS # BLD AUTO: 0.05 X10(3)/MCL
BASOPHILS NFR BLD AUTO: 0.6 %
BILIRUB SERPL-MCNC: 0.4 MG/DL
BILIRUB UR QL STRIP.AUTO: NEGATIVE
BUN SERPL-MCNC: 13.8 MG/DL (ref 9.8–20.1)
CALCIUM SERPL-MCNC: 9.2 MG/DL (ref 8.4–10.2)
CHLORIDE SERPL-SCNC: 112 MMOL/L (ref 98–107)
CO2 SERPL-SCNC: 24 MMOL/L (ref 22–29)
COLOR UR AUTO: ABNORMAL
CREAT SERPL-MCNC: 0.76 MG/DL (ref 0.55–1.02)
EOSINOPHIL # BLD AUTO: 0.25 X10(3)/MCL (ref 0–0.9)
EOSINOPHIL NFR BLD AUTO: 3.2 %
ERYTHROCYTE [DISTWIDTH] IN BLOOD BY AUTOMATED COUNT: 14.3 % (ref 11.5–17)
FLUAV AG UPPER RESP QL IA.RAPID: NOT DETECTED
FLUBV AG UPPER RESP QL IA.RAPID: NOT DETECTED
GFR SERPLBLD CREATININE-BSD FMLA CKD-EPI: >60 MLS/MIN/1.73/M2
GLOBULIN SER-MCNC: 3.8 GM/DL (ref 2.4–3.5)
GLUCOSE SERPL-MCNC: 102 MG/DL (ref 74–100)
GLUCOSE UR QL STRIP.AUTO: NORMAL
HCT VFR BLD AUTO: 43.5 % (ref 37–47)
HGB BLD-MCNC: 14.1 G/DL (ref 12–16)
IMM GRANULOCYTES # BLD AUTO: 0.03 X10(3)/MCL (ref 0–0.04)
IMM GRANULOCYTES NFR BLD AUTO: 0.4 %
KETONES UR QL STRIP.AUTO: NEGATIVE
LEUKOCYTE ESTERASE UR QL STRIP.AUTO: NEGATIVE
LIPASE SERPL-CCNC: 32 U/L
LYMPHOCYTES # BLD AUTO: 1.98 X10(3)/MCL (ref 0.6–4.6)
LYMPHOCYTES NFR BLD AUTO: 25.2 %
MCH RBC QN AUTO: 26.6 PG (ref 27–31)
MCHC RBC AUTO-ENTMCNC: 32.4 G/DL (ref 33–36)
MCV RBC AUTO: 82.1 FL (ref 80–94)
MONOCYTES # BLD AUTO: 0.62 X10(3)/MCL (ref 0.1–1.3)
MONOCYTES NFR BLD AUTO: 7.9 %
MUCOUS THREADS URNS QL MICRO: ABNORMAL /LPF
NEUTROPHILS # BLD AUTO: 4.92 X10(3)/MCL (ref 2.1–9.2)
NEUTROPHILS NFR BLD AUTO: 62.7 %
NITRITE UR QL STRIP.AUTO: NEGATIVE
NRBC BLD AUTO-RTO: 0 %
PH UR STRIP.AUTO: 7 [PH]
PLATELET # BLD AUTO: 218 X10(3)/MCL (ref 130–400)
PMV BLD AUTO: 11.4 FL (ref 7.4–10.4)
POTASSIUM SERPL-SCNC: 3.7 MMOL/L (ref 3.5–5.1)
PROT SERPL-MCNC: 7 GM/DL (ref 6.4–8.3)
PROT UR QL STRIP.AUTO: NEGATIVE
RBC # BLD AUTO: 5.3 X10(6)/MCL (ref 4.2–5.4)
RBC #/AREA URNS AUTO: ABNORMAL /HPF
RBC UR QL AUTO: NEGATIVE
SARS-COV-2 RNA RESP QL NAA+PROBE: NOT DETECTED
SODIUM SERPL-SCNC: 141 MMOL/L (ref 136–145)
SP GR UR STRIP.AUTO: 1.02 (ref 1–1.03)
SQUAMOUS #/AREA URNS LPF: ABNORMAL /HPF
STREP A PCR (OHS): NOT DETECTED
UROBILINOGEN UR STRIP-ACNC: NORMAL
WBC # SPEC AUTO: 7.85 X10(3)/MCL (ref 4.5–11.5)
WBC #/AREA URNS AUTO: ABNORMAL /HPF

## 2024-03-29 PROCEDURE — 99285 EMERGENCY DEPT VISIT HI MDM: CPT | Mod: 25

## 2024-03-29 PROCEDURE — 25000003 PHARM REV CODE 250: Performed by: EMERGENCY MEDICINE

## 2024-03-29 PROCEDURE — 25500020 PHARM REV CODE 255: Performed by: EMERGENCY MEDICINE

## 2024-03-29 PROCEDURE — 0240U COVID/FLU A&B PCR: CPT | Performed by: STUDENT IN AN ORGANIZED HEALTH CARE EDUCATION/TRAINING PROGRAM

## 2024-03-29 PROCEDURE — 63600175 PHARM REV CODE 636 W HCPCS: Performed by: EMERGENCY MEDICINE

## 2024-03-29 PROCEDURE — 80053 COMPREHEN METABOLIC PANEL: CPT | Performed by: STUDENT IN AN ORGANIZED HEALTH CARE EDUCATION/TRAINING PROGRAM

## 2024-03-29 PROCEDURE — 96374 THER/PROPH/DIAG INJ IV PUSH: CPT

## 2024-03-29 PROCEDURE — 85025 COMPLETE CBC W/AUTO DIFF WBC: CPT | Performed by: STUDENT IN AN ORGANIZED HEALTH CARE EDUCATION/TRAINING PROGRAM

## 2024-03-29 PROCEDURE — 83690 ASSAY OF LIPASE: CPT | Performed by: EMERGENCY MEDICINE

## 2024-03-29 PROCEDURE — 87651 STREP A DNA AMP PROBE: CPT | Performed by: STUDENT IN AN ORGANIZED HEALTH CARE EDUCATION/TRAINING PROGRAM

## 2024-03-29 PROCEDURE — 81001 URINALYSIS AUTO W/SCOPE: CPT | Performed by: STUDENT IN AN ORGANIZED HEALTH CARE EDUCATION/TRAINING PROGRAM

## 2024-03-29 RX ORDER — FAMOTIDINE 40 MG/1
40 TABLET, FILM COATED ORAL NIGHTLY
Qty: 30 TABLET | Refills: 0 | Status: SHIPPED | OUTPATIENT
Start: 2024-03-29 | End: 2024-04-28

## 2024-03-29 RX ORDER — ALUMINUM HYDROXIDE, MAGNESIUM HYDROXIDE, AND SIMETHICONE 1200; 120; 1200 MG/30ML; MG/30ML; MG/30ML
30 SUSPENSION ORAL ONCE
Status: COMPLETED | OUTPATIENT
Start: 2024-03-29 | End: 2024-03-29

## 2024-03-29 RX ORDER — DICYCLOMINE HYDROCHLORIDE 20 MG/1
20 TABLET ORAL EVERY 6 HOURS PRN
Qty: 20 TABLET | Refills: 0 | Status: SHIPPED | OUTPATIENT
Start: 2024-03-29 | End: 2024-04-28

## 2024-03-29 RX ORDER — LIDOCAINE HYDROCHLORIDE 20 MG/ML
15 SOLUTION OROPHARYNGEAL ONCE
Status: COMPLETED | OUTPATIENT
Start: 2024-03-29 | End: 2024-03-29

## 2024-03-29 RX ORDER — KETOROLAC TROMETHAMINE 30 MG/ML
15 INJECTION, SOLUTION INTRAMUSCULAR; INTRAVENOUS
Status: COMPLETED | OUTPATIENT
Start: 2024-03-29 | End: 2024-03-29

## 2024-03-29 RX ADMIN — LIDOCAINE HYDROCHLORIDE 15 ML: 20 SOLUTION ORAL at 07:03

## 2024-03-29 RX ADMIN — KETOROLAC TROMETHAMINE 15 MG: 30 INJECTION, SOLUTION INTRAMUSCULAR at 06:03

## 2024-03-29 RX ADMIN — IOHEXOL 100 ML: 350 INJECTION, SOLUTION INTRAVENOUS at 06:03

## 2024-03-29 RX ADMIN — ALUMINUM HYDROXIDE, MAGNESIUM HYDROXIDE, AND DIMETHICONE 30 ML: 200; 20; 200 SUSPENSION ORAL at 07:03

## 2024-03-29 NOTE — Clinical Note
[facility]rl[facility] Rios[facility]2024.  She may return to work on 04/01/2024.       If you have any questions or concerns, please don't hesitate to call.       RN    "

## 2024-03-29 NOTE — DISCHARGE INSTRUCTIONS
Avoid medications like ibuprofen/advil/motrin, aleve/naproxen, aspirin/bc powder/goody's and stick to a bland diet avoiding spicy, acidic food. Take the pepcid every night

## 2024-03-29 NOTE — ED PROVIDER NOTES
"Encounter Date: 3/29/2024    SCRIBE #1 NOTE: I, Sonya Rizzo, am scribing for, and in the presence of,  Kaylene Coleman MD. I have scribed the following portions of the note - Other sections scribed: HPI, ROS and physical.       History     Chief Complaint   Patient presents with    Abdominal Pain     Pt c/o productive cough w/ thick yellow mucus x3 days, fever, Tmax 101 yesterday, as well as throat pain x3 days. Pt also c/o generalized abd pain, denies n/v/d. Denies any dysuria as well.     This is a 54 y/o female with a medical hx of HYN that presents to the ED for abdominal pain onset one week. The pt states that the pain is present along her naval and does not radiate. She describes the pain to feel to be "like a knot" and states at one point her stomach was swollen with what felt like a "ball", but has since went down. Pt denies flatulence, belching, hearty burn and abnormal BM. Her last BM was last night. Pt also states that three days ago she began with sneezing and a runny nose. Her symtpoms progressed to a productive cough, congestion and a fever with a Tmax 101 last night. She does not know of any sick contacts.  Pt has been taking Aleve, Claratin, and Saline solution. Denies n/v/d, dysuria, hematuria. Reports abdominal pain, cough, congestion, fever, sneezing, runny nose.         The history is provided by the patient. No  was used.     Review of patient's allergies indicates:   Allergen Reactions    Latex     Doxycycline hyclate (bulk) Rash    Latex, natural rubber Rash    Pcn [penicillins] Rash    Sulfa (sulfonamide antibiotics) Rash     Past Medical History:   Diagnosis Date    Seizures      Past Surgical History:   Procedure Laterality Date     SECTION      x2    CYST REMOVAL      from back    STRABISMUS SURGERY Bilateral 2018    Procedure: STRABISMUS SURGERY;  Surgeon: MAC Canchola Jr., MD;  Location: [facility] OR [address] [place] [address];  Service: Ophthalmology;  " Laterality: Bilateral;     No family history on file.  Social History     Tobacco Use    Smoking status: Every Day     Current packs/day: 1.00     Types: Cigarettes    Smokeless tobacco: Never   Substance Use Topics    Alcohol use: No    Drug use: No     Review of Systems   Constitutional:  Positive for fever.   HENT:  Positive for congestion, rhinorrhea and sneezing.    Respiratory:  Positive for cough.    Gastrointestinal:  Positive for abdominal pain. Negative for diarrhea, nausea and vomiting.   Genitourinary:  Negative for dysuria and hematuria.       Physical Exam     Initial Vitals [03/29/24 0503]   BP Pulse Resp Temp SpO2   (!) 181/106 95 18 97.7 °F (36.5 °C) 100 %      MAP       --         Physical Exam    Nursing note and vitals reviewed.  Constitutional: She appears well-developed and well-nourished. She is not diaphoretic. No distress.   HENT:   Head: Normocephalic and atraumatic.   Nose: Nose normal.   Mouth/Throat: Oropharynx is clear and moist.   Eyes: Conjunctivae and EOM are normal. Pupils are equal, round, and reactive to light.   Neck: Trachea normal. Neck supple.   Normal range of motion.  Cardiovascular:  Normal rate, regular rhythm, normal heart sounds and intact distal pulses.           No murmur heard.  Pulmonary/Chest: Breath sounds normal. No respiratory distress. She has no wheezes. She has no rhonchi. She has no rales. She exhibits no tenderness.   Abdominal: Abdomen is soft. Bowel sounds are normal. She exhibits distension. She exhibits no mass. There is generalized abdominal tenderness and tenderness in the right upper quadrant, epigastric area and left upper quadrant. There is guarding. There is no rebound.   Musculoskeletal:         General: No tenderness or edema. Normal range of motion.      Cervical back: Normal range of motion and neck supple.      Lumbar back: Normal. Normal range of motion.     Neurological: She is alert and oriented to person, place, and time. She has normal  strength. No cranial nerve deficit or sensory deficit.   Skin: Skin is warm and dry. Capillary refill takes less than 2 seconds. No abscess noted. No erythema. No pallor.   Psychiatric: She has a normal mood and affect. Her behavior is normal. Judgment and thought content normal.         ED Course   Procedures  Labs Reviewed   COMPREHENSIVE METABOLIC PANEL - Abnormal; Notable for the following components:       Result Value    Chloride 112 (*)     Glucose Level 102 (*)     Albumin Level 3.2 (*)     Globulin 3.8 (*)     Albumin/Globulin Ratio 0.8 (*)     All other components within normal limits   URINALYSIS, REFLEX TO URINE CULTURE - Abnormal; Notable for the following components:    Mucous, UA Trace (*)     All other components within normal limits   CBC WITH DIFFERENTIAL - Abnormal; Notable for the following components:    MCH 26.6 (*)     MCHC 32.4 (*)     MPV 11.4 (*)     All other components within normal limits   COVID/FLU A&B PCR - Normal    Narrative:     The Xpert Xpress SARS-CoV-2/FLU/RSV plus is a rapid, multiplexed real-time PCR test intended for the simultaneous qualitative detection and differentiation of SARS-CoV-2, Influenza A, Influenza B, and respiratory syncytial virus (RSV) viral RNA in either nasopharyngeal swab or nasal swab specimens.         STREP GROUP A BY PCR - Normal    Narrative:     The Xpert Xpress Strep A test is a rapid, qualitative in vitro diagnostic test for the detection of Streptococcus pyogenes (Group A ß-hemolytic Streptococcus, Strep A) in throat swab specimens from patients with signs and symptoms of pharyngitis.     LIPASE - Normal   CBC W/ AUTO DIFFERENTIAL    Narrative:     The following orders were created for panel order CBC auto differential.  Procedure                               Abnormality         Status                     ---------                               -----------         ------                     CBC with Differential[[id number]]       Abnormal             Final result                 Please view results for these tests on the individual orders.          Imaging Results              CT Abdomen Pelvis With IV Contrast NO Oral Contrast (Preliminary result)  Result time 03/29/24 06:49:24      Preliminary result by Raz Ruff Jr., MD (03/29/24 06:49:24)                   Narrative:    START OF REPORT:  Technique: CT of the abdomen and pelvis was performed with axial images as well as sagittal and coronal reconstruction images with intravenous contrast.    Comparison: None available.    Clinical History: Epigastric pain.    Dosage Information: Automated Exposure Control was utilized.    Findings:  Lines and Tubes: None.  Thorax:  Lungs: The visualized lung bases appear unremarkable. No focal infiltrate or consolidation is seen.  Pleura: No effusions or thickening.  Heart: The heart size is within normal limits.  Abdomen:  Abdominal Wall: No abdominal wall pathology is seen.  Liver: Mild to moderate fatty infiltration of the liver is present. The liver otherwise appears unremarkable.  Biliary System: No intrahepatic or extrahepatic biliary duct dilatation is seen.  Gallbladder: Surgical clips are seen in the gallbladder fossa consistent with prior cholecystectomy.  Pancreas: The pancreas appears unremarkable.  Spleen: The spleen appears unremarkable.  Adrenals: The adrenal glands appear unremarkable.  Kidneys: The kidneys appear unremarkable with no stones cysts masses or hydronephrosis.  Aorta: The abdominal aorta appears unremarkable.  IVC: Unremarkable.  Bowel:  Esophagus: There is a small hiatal hernia. There appears to be mild thickening versus underdistention of the distal esophagus.  Stomach: The stomach appears unremarkable.  Duodenum: Unremarkable appearing duodenum.  Small Bowel: The small bowel appears unremarkable.  Colon: Nondistended.  Appendix: The appendix appears unremarkable and is seen on Image 104, Series 2 through Image 90, Series  2.  Peritoneum: No intraperitoneal free air or ascites is seen.    Pelvis:  Bladder: The bladder appears unremarkable.  Female:  Uterus: The uterus appears unremarkable.  Ovaries: No adnexal masses are seen.    Bony structures:  Dorsal Spine: There is subtle spondylosis of the visualized dorsal spine.  Bony Pelvis: There is subtle degenerative change of the hip.      Impression:  1. There appears to be mild thickening versus underdistention of the distal esophagus. Correlate clinically as regards possible reflux esophagitis.  2. No acute intraabdominal or pelvic solid organ or bowel pathology otherwise identified. Details and other findings as discussed above.                                         X-Ray Chest PA And Lateral (Preliminary result)  Result time 03/29/24 06:47:49      Wet Read by Kaylene Coleman MD (03/29/24 06:47:49, [facility] - Emergency Dept, Emergency Medicine)    No acute cardiopulmonary abnormality noted                                  X-Rays:   Independently Interpreted Readings:   Chest X-Ray: Normal heart size.  No infiltrates.  No acute abnormalities.     Medications   aluminum-magnesium hydroxide-simethicone 200-200-20 mg/5 mL suspension 30 mL (has no administration in time range)     And   LIDOcaine viscous HCl 2% oral solution 15 mL (has no administration in time range)   ketorolac injection 15 mg (15 mg Intravenous Given 3/29/24 0656)   iohexoL (OMNIPAQUE 350) injection 100 mL (100 mLs Intravenous Given 3/29/24 0645)     Medical Decision Making  Differential Diagnosis includes, but is not limited to: Bowel Obstruction, ileus, pancreatis, GERD, bronchitis, Viral URI  Cbc, cmp, lipase, ua, covid/flu, strep, cxr, ct abd pelvis ordered and reviewed  Labs unremarkable  Iv toradol given, her s/s were not fully sounding like gerd/gastritis; ct with possible gerd, otherwise normal, cxr negative, labs normal  Given epigastric pain and ct findings will treat like  gastritis/gerd    Problems Addressed:  Benign essential HTN: chronic illness or injury  Cough: acute illness or injury that poses a threat to life or bodily functions  Epigastric abdominal pain: acute illness or injury that poses a threat to life or bodily functions  Upper abdominal pain: acute illness or injury that poses a threat to life or bodily functions  Viral URI with cough: acute illness or injury that poses a threat to life or bodily functions    Amount and/or Complexity of Data Reviewed  External Data Reviewed: notes.  Labs: ordered.  Radiology: ordered and independent interpretation performed.    Risk  OTC drugs.  Prescription drug management.            Scribe Attestation:   Scribe #1: I performed the above scribed service and the documentation accurately describes the services I performed. I attest to the accuracy of the note.  Comments: Attending:   Physician Attestation Statement for Scribe #1: Kaylene ABBASI MD, personally performed the services described in this documentation. All medical record entries made by the scribe were at my direction and in my presence.  I have reviewed the chart and agree that the record reflects my personal performance and is accurate and complete.        Attending Attestation:           Physician Attestation for Scribe:  Physician Attestation Statement for Scribe #1: Kaylene ABBASI MD, reviewed documentation, as scribed by Sonya Rizzo in my presence, and it is both accurate and complete.             ED Course as of 03/29/24 0740   Fri Mar 29, 2024   0626 Cbc and cmp are unremarkable, lipase ordered to add on  [BS]   0719 Discussed results, already adheres to a pretty bland diet [BS]      ED Course User Index  [BS] Kaylene Coleman MD                 Medical Decision Making:   History:   Old Medical Records: I decided to obtain old medical records.  Old Records Summarized: records from clinic visits and records from previous admission(s).       <>  Summary of Records: Previous outpatient visits and ed visits for viral uri or msk complaints  Clinical Tests:   Lab Tests: Ordered and Reviewed  Radiological Study: Ordered and Reviewed             Clinical Impression:  Final diagnoses:  [R05.9] Cough  [I10] Benign essential HTN  [R10.10] Upper abdominal pain (Primary)  [J06.9] Viral URI with cough  [R10.13] Epigastric abdominal pain          ED Disposition Condition    Discharge Stable          ED Prescriptions       Medication Sig Dispense Start Date End Date Auth. Provider    famotidine (PEPCID) 40 MG tablet Take 1 tablet (40 mg total) by mouth every evening. 30 tablet 3/29/2024 4/28/2024 Kaylene Coleman MD    dicyclomine (BENTYL) 20 mg tablet Take 1 tablet (20 mg total) by mouth every 6 (six) hours as needed (abdominal cramping). 20 tablet 3/29/2024 4/28/2024 Kaylene Coleman MD          Follow-up Information       Follow up With Specialties Details Why Contact Info    your new primary care doctor  Schedule an appointment as soon as possible for a visit       [facility] - Emergency Dept Emergency Medicine  As needed, If symptoms worsen [address] [facility] [address]  [phone]             Kaylene Coleman MD  03/29/24 0747

## 2024-03-29 NOTE — ED TRIAGE NOTES
Pt c/o productive cough w/ thick yellow mucus x3 days, fever, Tmax 101 yesterday, as well as throat pain x3 days. Pt also c/o generalized abd pain, denies n/v/d. Denies any dysuria as well.

## 2024-03-29 NOTE — ED NOTES
Patient to ER amb w/ cc of abd pain et cold s/s. States her abd has been hurting near her naval. States she thought she was constipated so she took laxatives- did not help. Denies nausea. RR nonlabored, NAD. Noted nasal congestion. Patient denies recent injury.

## 2025-02-27 ENCOUNTER — OFFICE VISIT (OUTPATIENT)
Dept: URGENT CARE | Facility: CLINIC | Age: 55
End: 2025-02-27

## 2025-02-27 VITALS
TEMPERATURE: 99 F | HEART RATE: 90 BPM | SYSTOLIC BLOOD PRESSURE: 147 MMHG | RESPIRATION RATE: 18 BRPM | DIASTOLIC BLOOD PRESSURE: 100 MMHG | OXYGEN SATURATION: 100 % | BODY MASS INDEX: 36.76 KG/M2 | WEIGHT: 194.69 LBS | HEIGHT: 61 IN

## 2025-02-27 DIAGNOSIS — R05.9 COUGH, UNSPECIFIED TYPE: ICD-10-CM

## 2025-02-27 DIAGNOSIS — U07.1 COVID-19: Primary | ICD-10-CM

## 2025-02-27 LAB
CTP QC/QA: YES
CTP QC/QA: YES
POC MOLECULAR INFLUENZA A AGN: NEGATIVE
POC MOLECULAR INFLUENZA B AGN: NEGATIVE
SARS-COV-2 RDRP RESP QL NAA+PROBE: POSITIVE

## 2025-02-27 PROCEDURE — 87635 SARS-COV-2 COVID-19 AMP PRB: CPT | Mod: PBBFAC

## 2025-02-27 PROCEDURE — 99215 OFFICE O/P EST HI 40 MIN: CPT | Mod: PBBFAC

## 2025-02-27 PROCEDURE — 87502 INFLUENZA DNA AMP PROBE: CPT | Mod: PBBFAC

## 2025-02-27 PROCEDURE — 99213 OFFICE O/P EST LOW 20 MIN: CPT | Mod: S$PBB,,,

## 2025-02-27 RX ORDER — DEXBROMPHENIRAMINE MALEATE, PHENYLEPHRINE HYDROCHLORIDE 2; 7.5 MG/1; MG/1
2-7.5 TABLET ORAL 4 TIMES DAILY PRN
Qty: 28 TABLET | Refills: 0 | Status: SHIPPED | OUTPATIENT
Start: 2025-02-27 | End: 2025-03-06

## 2025-02-27 RX ORDER — PROMETHAZINE HYDROCHLORIDE AND DEXTROMETHORPHAN HYDROBROMIDE 6.25; 15 MG/5ML; MG/5ML
5 SYRUP ORAL EVERY 4 HOURS PRN
Qty: 118 ML | Refills: 0 | Status: SHIPPED | OUTPATIENT
Start: 2025-02-27 | End: 2025-03-09

## 2025-02-27 NOTE — LETTER
February 27, 2025      [facility]  [facility] [facility] [address]  Phone: [phone]       Patient: Erlinda Alexander   YOB: 1970  Date of Visit: 02/27/2025    To Whom It May Concern:    Marco Alexander  was at [facility] on 02/27/2025. The patient may return to work/school on 3/3/2025 with no restrictions. If you have any questions or concerns, or if I can be of further assistance, please do not hesitate to contact me.    Sincerely,    DANNY Henry

## 2025-02-27 NOTE — PROGRESS NOTES
"Subjective:       Patient ID: Erlinda Alexander is a 54 y.o. female.    Vitals:  height is 5' 1" (1.549 m) and weight is 88.3 kg (194 lb 10.7 oz). Her temperature is 98.7 °F (37.1 °C). Her blood pressure is 147/100 (abnormal) and her pulse is 90. Her respiration is 18 and oxygen saturation is 100%.     Chief Complaint: URI (Cough, body aches x 3days)    54-year-old female reports to the clinic with complaints of cough, headache, and body aches that began 3 days ago.  Patient states she has taken over-the-counter Tylenol for this problem with little relief.  Patient states that her cough is worse at night and worsens when she lays down flat.  Patient states she was unable to sleep last night because of her cough.  Patient describes the cough is dry and nonproductive.    All other systems are negative    Chart reviewed    Objective:   Physical Exam   Constitutional: She appears well-developed.  Non-toxic appearance. She does not appear ill. No distress.   HENT:   Head: Normocephalic and atraumatic.   Ears:   Right Ear: Hearing, tympanic membrane, external ear and ear canal normal.   Left Ear: Hearing, tympanic membrane, external ear and ear canal normal.   Nose: Mucosal edema and rhinorrhea present. No purulent discharge. Right sinus exhibits no maxillary sinus tenderness and no frontal sinus tenderness. Left sinus exhibits no maxillary sinus tenderness and no frontal sinus tenderness.   Mouth/Throat: Uvula is midline. Posterior oropharyngeal edema and posterior oropharyngeal erythema present.   Eyes: Right eye exhibits no discharge. Left eye exhibits no discharge. Extraocular movement intact   Neck: Neck supple. No neck rigidity present.   Cardiovascular: Regular rhythm.   Pulmonary/Chest: Effort normal and breath sounds normal. No respiratory distress. She has no wheezes. She has no rhonchi. She has no rales.   Lymphadenopathy:     She has no cervical adenopathy.   Neurological: She is alert.   Skin: Skin is warm, " dry and not diaphoretic.   Psychiatric: Her behavior is normal.   Nursing note and vitals reviewed.      Assessment:     1. COVID-19    2. Cough, unspecified type        Results for orders placed or performed in visit on 02/27/25   POCT Influenza A/B Molecular    Collection Time: 02/27/25 11:27 AM   Result Value Ref Range    POC Molecular Influenza A Ag Negative Negative    POC Molecular Influenza B Ag Negative Negative     Acceptable Yes    POCT COVID-19 Rapid Screening    Collection Time: 02/27/25 11:28 AM   Result Value Ref Range    POC Rapid COVID Positive (A) Negative     Acceptable Yes          Plan:     Discussed COVID 19 isolation instructions, contagious precautions.      Use over-the-counter or prescribed medications for symptoms. Please read patient education material and seek care immediately if new or worsening symptoms develop.       COVID-19  -     promethazine-dextromethorphan (PROMETHAZINE-DM) 6.25-15 mg/5 mL Syrp; Take 5 mLs by mouth every 4 (four) hours as needed (COUGH).  Dispense: 118 mL; Refill: 0  -     dexbrompheniramine-phenyleph (ALAHIST PE) 2-7.5 mg Tab; Take 2-7.5 mg by mouth 4 (four) times daily as needed (CONGESTION).  Dispense: 28 tablet; Refill: 0    Cough, unspecified type  -     POCT COVID-19 Rapid Screening  -     POCT Influenza A/B Molecular        Please note: This chart was completed via voice to text dictation. It may contain typographical/word recognition errors. If there are any questions, please contact the provider for final clarification.

## 2025-04-11 ENCOUNTER — HOSPITAL ENCOUNTER (EMERGENCY)
Facility: HOSPITAL | Age: 55
Discharge: HOME | End: 2025-04-11
Attending: EMERGENCY MEDICINE
Payer: MEDICAID

## 2025-04-11 VITALS
TEMPERATURE: 98 F | HEIGHT: 61 IN | SYSTOLIC BLOOD PRESSURE: 154 MMHG | BODY MASS INDEX: 36.69 KG/M2 | DIASTOLIC BLOOD PRESSURE: 101 MMHG | OXYGEN SATURATION: 99 % | HEART RATE: 80 BPM | WEIGHT: 194.31 LBS | RESPIRATION RATE: 20 BRPM

## 2025-04-11 DIAGNOSIS — K43.9 VENTRAL HERNIA WITHOUT OBSTRUCTION OR GANGRENE: Primary | ICD-10-CM

## 2025-04-11 LAB
ALBUMIN SERPL-MCNC: 3.7 G/DL (ref 3.5–5)
ALBUMIN/GLOB SERPL: 0.9 RATIO (ref 1.1–2)
ALP SERPL-CCNC: 82 UNIT/L (ref 40–150)
ALT SERPL-CCNC: 26 UNIT/L (ref 0–55)
ANION GAP SERPL CALC-SCNC: 7 MEQ/L
AST SERPL-CCNC: 32 UNIT/L (ref 11–45)
BACTERIA #/AREA URNS AUTO: ABNORMAL /HPF
BASOPHILS # BLD AUTO: 0.03 X10(3)/MCL
BASOPHILS NFR BLD AUTO: 0.4 %
BILIRUB SERPL-MCNC: 0.6 MG/DL
BILIRUB UR QL STRIP.AUTO: NEGATIVE
BUN SERPL-MCNC: 12.6 MG/DL (ref 9.8–20.1)
CALCIUM SERPL-MCNC: 9.4 MG/DL (ref 8.4–10.2)
CHLORIDE SERPL-SCNC: 109 MMOL/L (ref 98–107)
CLARITY UR: CLEAR
CO2 SERPL-SCNC: 24 MMOL/L (ref 22–29)
COLOR UR AUTO: YELLOW
CREAT SERPL-MCNC: 0.74 MG/DL (ref 0.55–1.02)
CREAT/UREA NIT SERPL: 17
EOSINOPHIL # BLD AUTO: 0.08 X10(3)/MCL (ref 0–0.9)
EOSINOPHIL NFR BLD AUTO: 1.1 %
ERYTHROCYTE [DISTWIDTH] IN BLOOD BY AUTOMATED COUNT: 15.1 % (ref 11.5–17)
GFR SERPLBLD CREATININE-BSD FMLA CKD-EPI: >60 ML/MIN/1.73/M2
GLOBULIN SER-MCNC: 4.3 GM/DL (ref 2.4–3.5)
GLUCOSE SERPL-MCNC: 89 MG/DL (ref 74–100)
GLUCOSE UR QL STRIP: NORMAL
HCT VFR BLD AUTO: 49.2 % (ref 37–47)
HGB BLD-MCNC: 15.7 G/DL (ref 12–16)
HGB UR QL STRIP: NEGATIVE
HOLD SPECIMEN: NORMAL
HYALINE CASTS #/AREA URNS LPF: ABNORMAL /LPF
IMM GRANULOCYTES # BLD AUTO: 0.02 X10(3)/MCL (ref 0–0.04)
IMM GRANULOCYTES NFR BLD AUTO: 0.3 %
KETONES UR QL STRIP: NEGATIVE
LACTATE SERPL-SCNC: 1.2 MMOL/L (ref 0.5–2.2)
LACTATE SERPL-SCNC: 1.5 MMOL/L (ref 0.5–2.2)
LEUKOCYTE ESTERASE UR QL STRIP: NEGATIVE
LIPASE SERPL-CCNC: 16 U/L
LYMPHOCYTES # BLD AUTO: 2.78 X10(3)/MCL (ref 0.6–4.6)
LYMPHOCYTES NFR BLD AUTO: 37.1 %
MCH RBC QN AUTO: 27.1 PG (ref 27–31)
MCHC RBC AUTO-ENTMCNC: 31.9 G/DL (ref 33–36)
MCV RBC AUTO: 85 FL (ref 80–94)
MONOCYTES # BLD AUTO: 0.38 X10(3)/MCL (ref 0.1–1.3)
MONOCYTES NFR BLD AUTO: 5.1 %
MUCOUS THREADS URNS QL MICRO: ABNORMAL /LPF
NEUTROPHILS # BLD AUTO: 4.21 X10(3)/MCL (ref 2.1–9.2)
NEUTROPHILS NFR BLD AUTO: 56 %
NITRITE UR QL STRIP: NEGATIVE
NRBC BLD AUTO-RTO: 0 %
PH UR STRIP: 6.5 [PH]
PLATELET # BLD AUTO: 136 X10(3)/MCL (ref 130–400)
PLATELETS.RETICULATED NFR BLD AUTO: 17.1 % (ref 0.9–11.2)
PMV BLD AUTO: 12.6 FL (ref 7.4–10.4)
POTASSIUM SERPL-SCNC: 4.6 MMOL/L (ref 3.5–5.1)
PROT SERPL-MCNC: 8 GM/DL (ref 6.4–8.3)
PROT UR QL STRIP: NEGATIVE
RBC # BLD AUTO: 5.79 X10(6)/MCL (ref 4.2–5.4)
RBC #/AREA URNS AUTO: ABNORMAL /HPF
SODIUM SERPL-SCNC: 140 MMOL/L (ref 136–145)
SP GR UR STRIP.AUTO: 1.02 (ref 1–1.03)
SQUAMOUS #/AREA URNS LPF: ABNORMAL /HPF
UROBILINOGEN UR STRIP-ACNC: ABNORMAL
WBC # BLD AUTO: 7.5 X10(3)/MCL (ref 4.5–11.5)
WBC #/AREA URNS AUTO: ABNORMAL /HPF

## 2025-04-11 PROCEDURE — 96374 THER/PROPH/DIAG INJ IV PUSH: CPT

## 2025-04-11 PROCEDURE — 81001 URINALYSIS AUTO W/SCOPE: CPT | Performed by: EMERGENCY MEDICINE

## 2025-04-11 PROCEDURE — 63600175 PHARM REV CODE 636 W HCPCS: Performed by: EMERGENCY MEDICINE

## 2025-04-11 PROCEDURE — 99285 EMERGENCY DEPT VISIT HI MDM: CPT | Mod: 25

## 2025-04-11 PROCEDURE — 83605 ASSAY OF LACTIC ACID: CPT | Performed by: EMERGENCY MEDICINE

## 2025-04-11 PROCEDURE — 80053 COMPREHEN METABOLIC PANEL: CPT | Performed by: EMERGENCY MEDICINE

## 2025-04-11 PROCEDURE — 83690 ASSAY OF LIPASE: CPT | Performed by: EMERGENCY MEDICINE

## 2025-04-11 PROCEDURE — 85025 COMPLETE CBC W/AUTO DIFF WBC: CPT | Performed by: EMERGENCY MEDICINE

## 2025-04-11 PROCEDURE — 25500020 PHARM REV CODE 255

## 2025-04-11 PROCEDURE — 96375 TX/PRO/DX INJ NEW DRUG ADDON: CPT

## 2025-04-11 RX ORDER — PROCHLORPERAZINE EDISYLATE 5 MG/ML
10 INJECTION INTRAMUSCULAR; INTRAVENOUS
Status: COMPLETED | OUTPATIENT
Start: 2025-04-11 | End: 2025-04-11

## 2025-04-11 RX ORDER — HYDROMORPHONE HYDROCHLORIDE 1 MG/ML
1 INJECTION, SOLUTION INTRAMUSCULAR; INTRAVENOUS; SUBCUTANEOUS
Refills: 0 | Status: COMPLETED | OUTPATIENT
Start: 2025-04-11 | End: 2025-04-11

## 2025-04-11 RX ADMIN — PROCHLORPERAZINE EDISYLATE 10 MG: 5 INJECTION INTRAMUSCULAR; INTRAVENOUS at 10:04

## 2025-04-11 RX ADMIN — HYDROMORPHONE HYDROCHLORIDE 1 MG: 1 INJECTION, SOLUTION INTRAMUSCULAR; INTRAVENOUS; SUBCUTANEOUS at 10:04

## 2025-04-11 RX ADMIN — IOHEXOL 100 ML: 350 INJECTION, SOLUTION INTRAVENOUS at 11:04

## 2025-04-11 NOTE — DISCHARGE INSTRUCTIONS
Follow instructions as given for abdominal wall hernia.      Weight loss can help.      Try to avoid bending, lifting, and straining.      If the hernia does protrude out again as it did in the last 2 days, you can achieve relief in most cases if you lie down flat on your back and relax, and have somebody apply gentle steady pressure to it like we did today.      Return to the ER as needed or if worse.      I have referred you to the General surgery Clinic, they will call you to schedule an appointment to discuss possible permanent surgical repair.

## 2025-04-11 NOTE — CONSULTS
[facility]  General Surgery - [facility] Team  Consult Note  Admit Date: 2025  HD#0    Patient Name: Erlinda Alexander  YOB: 1970  Date: 2025 1:15 PM  Date of Admission: 2025    PRESENTING HISTORY   Reason for Consult: Incarcerated ventral hernia    History of Present Illness:  Erlinda Alexander is a 54 y.o. female with a PMHx HTN. Here in the ED with incarcerated ventral hernia. By time of examination hernia is reduced. Surgery consulted to evaluate patient for procedure.     Patient states that she has not had a history of hernias until last night. States she was lifting ~50 lb boxes at [employer] yesterday. Says she noticed a bulge above her umbilicus that was associated with pain localized to the area. Denies n/v/d, CP, or SOB. Says she has no issues voiding. States she is able able to pass gas. Last BM this AM that that was soft. Last meal 0700 today that she tolerated well. Report h/o laparoscopic cholecystectomy at [facility] and 2x C-sections.     Last colonoscopy at [facility] 3 years ago that was unremarkable. No family h/o IBD or colon cancer.    Lives in [place]. Son at bedside.    Review of Systems:  12 point ROS negative except as stated in HPI    PAST HISTORY:   Past medical history:  Past Medical History:   Diagnosis Date    Seizures        Past surgical history:  Past Surgical History:   Procedure Laterality Date     SECTION      x2    CYST REMOVAL      from back    STRABISMUS SURGERY Bilateral 2018    Procedure: STRABISMUS SURGERY;  Surgeon: MAC Canchola Jr., MD;  Location: [facility] OR [address] [place] [address];  Service: Ophthalmology;  Laterality: Bilateral;       Family history:  N/a    Social history:  Tobacco: Smoker  Alcohol: None  Drug use: None    MEDICATIONS & ALLERGIES:   Home meds: per chart    Allergies: per chart    OBJECTIVE:   Vital Signs:  VITAL SIGNS: 24 HR MIN & MAX LAST   Temp  Min: 97.9 °F (36.6 °C)  Max: 97.9 °F (36.6 °C)   "97.9 °F (36.6 °C)   BP  Min: 146/106  Max: 151/108  (!) 151/108    Pulse  Min: 76  Max: 100  76    Resp  Min: 20  Max: 20  20    SpO2  Min: 100 %  Max: 100 %  100 %      HT: 5' 1" (154.9 cm)  WT: 88.1 kg (194 lb 4.8 oz)  BMI:       Intake/output:  N/a    Physical Exam:  General: Well developed, well nourished, no acute distress  CV: RR  Resp: NWOB on RA  GI:  Soft, nondistended. Supraumbilical hernia approximately  that was easily reducible. No skin changes  MSK: Moves all extremities spontaneously and purposefully  Skin/wounds:  No rashes, ulcers, erythema  Neuro:  CNII-XII grossly intact, A&Ox3    Labs:  Recent Labs     04/11/25  1007   WBC 7.50   HGB 15.7   HCT 49.2*         K 4.6   *   CO2 24   BUN 12.6   CREATININE 0.74   BILITOT 0.6   AST 32   ALT 26   ALKPHOS 82   CALCIUM 9.4   ALBUMIN 3.7       Imaging:  CT Abdomen Pelvis With IV Contrast NO Oral Contrast   Final Result      Interval development of what appears to be a strangulated anterior abdominal wall omental hernia.  There is some fat and vasculature within this hernia with associated inflammatory changes suggesting vascular compromise.  Surgical consultation is recommended.      Hepatic steatosis      Punctate benign-appearing 3 mm nodule in the right lower lobe stable since prior examination         Electronically signed by: Augustin Liz   Date:    04/11/2025   Time:    12:37         I have reviewed all pertinent imaging results/findings within the past 24 hours.    Micro/Path:  N/a    ASSESSMENT & PLAN:    54 y.o. female here with supraumbilical hernia that was reduced in ED prior to evaluation. No need for acute surgical intervention. Discussed need for eventual repair but due to elevated BMI patient is at risk for recurrence and encouraged patient to lose ~15 lbs to reduce risk of recurrence. Discussed smoking cessation as well. Additionally encouraged patient to obtain health insurance prior to hernia repair. Return precautions " given. Patient and son at bedside expressed understanding and is amenable to plan. All questions were answered.    - Recommend PO challenge in ED, can be discharged homeif passes  - Weight loss  - Smoking cessation  - Insurance discussion  - Outpatient follow-up with general surgery in 3 months    Junior Virgen MD  [employer] General Surgery, PGY-1

## 2025-04-11 NOTE — ED PROVIDER NOTES
Encounter Date: 2025       History     Chief Complaint   Patient presents with    Abdominal Pain     Right sided abdominal pain x2 hours     Upper mid/right abdominal pain since last night, worse today, tender to touch with small swelling that she had not previously noted.  No nausea, vomiting, urinary symptoms, fever, chills, other complaints.  She had a previous cholecystectomy.  Nondrinker.  Try to go to work but had to leave.  Symptoms worsened in the last few hours.  Nonsmoker.  No other complaints.    The history is provided by the patient. No  was used.     Review of patient's allergies indicates:   Allergen Reactions    Latex     Morphine     Doxycycline hyclate (bulk) Rash    Latex, natural rubber Rash    Pcn [penicillins] Rash    Sulfa (sulfonamide antibiotics) Rash     Past Medical History:   Diagnosis Date    Seizures      Past Surgical History:   Procedure Laterality Date     SECTION      x2    CYST REMOVAL      from back    STRABISMUS SURGERY Bilateral 2018    Procedure: STRABISMUS SURGERY;  Surgeon: MAC Canchola Jr., MD;  Location: [facility] OR [address] [place] [address];  Service: Ophthalmology;  Laterality: Bilateral;     Family History   Problem Relation Name Age of Onset    Diabetes type II Mother      Hypertension Mother      Hypertension Father       Social History[1]  Review of Systems   Gastrointestinal:  Positive for abdominal pain.       Physical Exam     Initial Vitals   BP Pulse Resp Temp SpO2   25 0835 25 0833 25 0833 25 0833 25 0833   (!) 146/106 100 20 97.9 °F (36.6 °C) 100 %      MAP       --                Physical Exam    Nursing note and vitals reviewed.  Constitutional: She appears well-developed and well-nourished. She is not diaphoretic. She appears distressed.   Obese   HENT:   Head: Normocephalic and atraumatic. Mouth/Throat: No oropharyngeal exudate.   Eyes: Conjunctivae and EOM are normal. Pupils are equal, round, and  reactive to light. Right eye exhibits no discharge. Left eye exhibits no discharge. No scleral icterus.   Neck: Neck supple. No thyromegaly present. No tracheal deviation present. No JVD present.   Normal range of motion.  Cardiovascular:  Normal rate, regular rhythm, normal heart sounds and intact distal pulses.     Exam reveals no gallop and no friction rub.       No murmur heard.  Pulmonary/Chest: Breath sounds normal. No stridor. No respiratory distress. She has no wheezes. She has no rhonchi. She has no rales. She exhibits no tenderness.   Abdominal: Abdomen is soft. Bowel sounds are normal. She exhibits distension. She exhibits no mass. There is abdominal tenderness.   Localized abdominal ventral wall hernia just to the right of midline, upper midportion of the abdomen, very tender to touch, measures approximately 3-4 cm in diameter.  With steady pressure and relatively poor patient cooperation, full reduction is obtained within 2 or 3 minutes at bedside with relief of symptoms. There is no rebound and no guarding.   Musculoskeletal:         General: No tenderness or edema. Normal range of motion.      Cervical back: Normal range of motion and neck supple.     Neurological: She is alert and oriented to person, place, and time. She has normal strength.   Skin: Skin is warm and dry. No rash and no abscess noted. No erythema.   Psychiatric: She has a normal mood and affect. Her behavior is normal. Judgment and thought content normal.         ED Course   Procedures  Labs Reviewed   COMPREHENSIVE METABOLIC PANEL - Abnormal       Result Value    Sodium 140      Potassium 4.6      Chloride 109 (*)     CO2 24      Glucose 89      Blood Urea Nitrogen 12.6      Creatinine 0.74      Calcium 9.4      Protein Total 8.0      Albumin 3.7      Globulin 4.3 (*)     Albumin/Globulin Ratio 0.9 (*)     Bilirubin Total 0.6      ALP 82      ALT 26      AST 32      eGFR >60      Anion Gap 7.0      BUN/Creatinine Ratio 17      URINALYSIS, REFLEX TO URINE CULTURE - Abnormal    Color, UA Yellow      Appearance, UA Clear      Specific Gravity, UA 1.023      pH, UA 6.5      Protein, UA Negative      Glucose, UA Normal      Ketones, UA Negative      Blood, UA Negative      Bilirubin, UA Negative      Urobilinogen, UA 1+ (*)     Nitrites, UA Negative      Leukocyte Esterase, UA Negative      RBC, UA 0-5      WBC, UA 0-5      Bacteria, UA None Seen      Squamous Epithelial Cells, UA Few (*)     Mucous, UA Trace (*)     Hyaline Casts, UA None Seen     CBC WITH DIFFERENTIAL - Abnormal    WBC 7.50      RBC 5.79 (*)     Hgb 15.7      Hct 49.2 (*)     MCV 85.0      MCH 27.1      MCHC 31.9 (*)     RDW 15.1      Platelet 136      MPV 12.6 (*)     IPF 17.1 (*)     Neut % 56.0      Lymph % 37.1      Mono % 5.1      Eos % 1.1      Basophil % 0.4      Imm Grans % 0.3      Neut # 4.21      Lymph # 2.78      Mono # 0.38      Eos # 0.08      Baso # 0.03      Imm Gran # 0.02      NRBC% 0.0     LIPASE - Normal    Lipase Level 16     LACTIC ACID, PLASMA - Normal    Lactic Acid Level 1.2     LACTIC ACID, PLASMA - Normal    Lactic Acid Level 1.5     CBC W/ AUTO DIFFERENTIAL    Narrative:     The following orders were created for panel order CBC W/ AUTO DIFFERENTIAL.  Procedure                               Abnormality         Status                     ---------                               -----------         ------                     CBC with Differential[[id number]]       Abnormal            Final result                 Please view results for these tests on the individual orders.   EXTRA TUBES    Narrative:     The following orders were created for panel order EXTRA TUBES.  Procedure                               Abnormality         Status                     ---------                               -----------         ------                     Gold Top Hold[[id number]]                                   Final result                 Please view results for  these tests on the individual orders.   GOLD TOP HOLD    Extra Tube Hold for add-ons.            Imaging Results              CT Abdomen Pelvis With IV Contrast NO Oral Contrast (Final result)  Result time 04/11/25 12:37:47      Final result by Carmen Liz MD (04/11/25 12:37:47)                   Impression:      Interval development of what appears to be a strangulated anterior abdominal wall omental hernia.  There is some fat and vasculature within this hernia with associated inflammatory changes suggesting vascular compromise.  Surgical consultation is recommended.    Hepatic steatosis    Punctate benign-appearing 3 mm nodule in the right lower lobe stable since prior examination      Electronically signed by: Augustin Liz  Date:    04/11/2025  Time:    12:37               Narrative:    EXAMINATION:  CT ABDOMEN PELVIS WITH IV CONTRAST    CLINICAL HISTORY:  Abdominal abscess/infection suspected;    TECHNIQUE:  Low dose axial images, sagittal and coronal reformations were obtained from the lung bases to the pubic symphysis following the IV administration of contrast. Automatic exposure control (AEC) is utilized to reduce patient radiation exposure.    COMPARISON:  03/29/2024    FINDINGS:  The lung bases are clear of acute infiltrates.  There is a punctate 3 mm nodule in the right lower lobe adjacent to the major fissure on image number 1 series 4.  This is stable since the prior examination.    There is evidence of hepatic steatosis..  No liver mass or lesion is seen.  Portal and hepatic veins appear normal.    The patient is status post cholecystectomy..    The pancreas appears normal.  No pancreatic mass or lesion is seen.    The spleen shows no acute abnormality.    The adrenal glands appear normal.  No adrenal nodule is seen.    The kidneys appear normal.  There is a simple cyst in the upper pole the right kidney.  No hydronephrosis is seen.  No hydroureter is seen.  No nephrolithiasis is seen.  No  obvious ureteral stones are seen.    Urinary bladder appears grossly unremarkable.    There has been interval development of an anterior abdominal wall supraumbilical hernia in the midline.  It is seen on images number 54 through 60 of series 2.  The hernia contains some epiploic fat and some vasculature and there are inflammatory changes seen associated with it consistent with vascular compromise/strangulation.  The transverse colon is adjacent to the hernia but is not herniated.    No colitis is seen.  No diverticulitis is seen.  No obvious colonic mass or lesion is seen.  No appendicitis is seen.    Uterus appears unremarkable.    No free air is seen.  No free fluid is seen.                                       Medications   HYDROmorphone injection 1 mg (1 mg Intravenous Given 4/11/25 1041)   prochlorperazine injection Soln 10 mg (10 mg Intravenous Given 4/11/25 1041)   iohexoL (OMNIPAQUE 350) 350 mg iodine/mL injection (100 mLs  Given 4/11/25 1130)       12:59 PM Resting comfortably, clinically much improved, no palpable hernia mass or tenderness.  CT findings noted, other data are relatively unremarkable.  Given the nature of findings on the radiology report, will ask surgery to see while in the ER for further recommendations.    3:42 PM appreciate surgery consult.  She has tolerated a p.o. challenge well.  As per their recommendations, she is appropriate for outpatient management and clinic follow-up, given the following discharge instructions:          Follow instructions as given for abdominal wall hernia.      Weight loss can help.      Try to avoid bending, lifting, and straining.      If the hernia does protrude out again as it did in the last 2 days, you can achieve relief in most cases if you lie down flat on your back and relax, and have somebody apply gentle steady pressure to it like we did today.      Return to the ER as needed or if worse.      I have referred you to the General surgery Clinic,  they will call you to schedule an appointment to discuss possible permanent surgical repair.        Medical Decision Making  Symptomatic abdominal ventral hernia, newly noticed yesterday, clinically reducible but has some concerning findings by CT, consulting General Surgery.    Problems Addressed:  Ventral hernia without obstruction or gangrene: acute illness or injury    Amount and/or Complexity of Data Reviewed  Labs: ordered.  Radiology: ordered.    Risk  Prescription drug management.      Additional MDM:   Differential Diagnosis:   Ventral abdominal hernia, strangulated hernia, other causes of abdominal mass and tenderness among others                                    Clinical Impression:  Final diagnoses:  [K43.9] Ventral hernia without obstruction or gangrene (Primary)          ED Disposition Condition    Discharge Stable          ED Prescriptions    None       Follow-up Information       Follow up With Specialties Details Why Contact Info    [facility] - Emergency Dept Emergency Medicine  As needed [address] [facility] [address] [phone]                 [1]   Social History  Tobacco Use    Smoking status: Every Day     Current packs/day: 1.00     Types: Cigarettes    Smokeless tobacco: Never   Substance Use Topics    Alcohol use: No    Drug use: No        Roberto Adler MD  04/11/25 1542

## 2025-06-07 ENCOUNTER — HOSPITAL ENCOUNTER (EMERGENCY)
Facility: HOSPITAL | Age: 55
Discharge: HOME | End: 2025-06-07
Attending: EMERGENCY MEDICINE

## 2025-06-07 VITALS
BODY MASS INDEX: 35.68 KG/M2 | SYSTOLIC BLOOD PRESSURE: 137 MMHG | RESPIRATION RATE: 18 BRPM | DIASTOLIC BLOOD PRESSURE: 93 MMHG | TEMPERATURE: 98 F | HEART RATE: 75 BPM | OXYGEN SATURATION: 98 % | WEIGHT: 189 LBS | HEIGHT: 61 IN

## 2025-06-07 DIAGNOSIS — K43.9 VENTRAL HERNIA WITHOUT OBSTRUCTION OR GANGRENE: Primary | ICD-10-CM

## 2025-06-07 DIAGNOSIS — R10.33 PERIUMBILICAL ABDOMINAL PAIN: ICD-10-CM

## 2025-06-07 DIAGNOSIS — R19.7 DIARRHEA, UNSPECIFIED TYPE: ICD-10-CM

## 2025-06-07 LAB
ALBUMIN SERPL-MCNC: 3.6 G/DL (ref 3.5–5)
ALBUMIN/GLOB SERPL: 0.9 RATIO (ref 1.1–2)
ALP SERPL-CCNC: 88 UNIT/L (ref 40–150)
ALT SERPL-CCNC: 24 UNIT/L (ref 0–55)
ANION GAP SERPL CALC-SCNC: 9 MEQ/L
AST SERPL-CCNC: 25 UNIT/L (ref 11–45)
BASOPHILS # BLD AUTO: 0.04 X10(3)/MCL
BASOPHILS NFR BLD AUTO: 0.5 %
BILIRUB SERPL-MCNC: 0.7 MG/DL
BILIRUB UR QL STRIP.AUTO: NEGATIVE
BUN SERPL-MCNC: 9 MG/DL (ref 9.8–20.1)
CALCIUM SERPL-MCNC: 9.6 MG/DL (ref 8.4–10.2)
CHLORIDE SERPL-SCNC: 109 MMOL/L (ref 98–107)
CLARITY UR: CLEAR
CO2 SERPL-SCNC: 24 MMOL/L (ref 22–29)
COLOR UR AUTO: YELLOW
CREAT SERPL-MCNC: 0.68 MG/DL (ref 0.55–1.02)
CREAT/UREA NIT SERPL: 13
EOSINOPHIL # BLD AUTO: 0.09 X10(3)/MCL (ref 0–0.9)
EOSINOPHIL NFR BLD AUTO: 1.2 %
ERYTHROCYTE [DISTWIDTH] IN BLOOD BY AUTOMATED COUNT: 14.6 % (ref 11.5–17)
GFR SERPLBLD CREATININE-BSD FMLA CKD-EPI: >60 ML/MIN/1.73/M2
GLOBULIN SER-MCNC: 4.2 GM/DL (ref 2.4–3.5)
GLUCOSE SERPL-MCNC: 82 MG/DL (ref 74–100)
GLUCOSE UR QL STRIP: NEGATIVE
HCT VFR BLD AUTO: 50.2 % (ref 37–47)
HGB BLD-MCNC: 15.6 G/DL (ref 12–16)
HGB UR QL STRIP: NEGATIVE
IMM GRANULOCYTES # BLD AUTO: 0.03 X10(3)/MCL (ref 0–0.04)
IMM GRANULOCYTES NFR BLD AUTO: 0.4 %
KETONES UR QL STRIP: NEGATIVE
LEUKOCYTE ESTERASE UR QL STRIP: NEGATIVE
LIPASE SERPL-CCNC: 20 U/L
LYMPHOCYTES # BLD AUTO: 2.82 X10(3)/MCL (ref 0.6–4.6)
LYMPHOCYTES NFR BLD AUTO: 37.1 %
MAGNESIUM SERPL-MCNC: 2.2 MG/DL (ref 1.6–2.6)
MCH RBC QN AUTO: 27 PG (ref 27–31)
MCHC RBC AUTO-ENTMCNC: 31.1 G/DL (ref 33–36)
MCV RBC AUTO: 87 FL (ref 80–94)
MONOCYTES # BLD AUTO: 0.43 X10(3)/MCL (ref 0.1–1.3)
MONOCYTES NFR BLD AUTO: 5.7 %
NEUTROPHILS # BLD AUTO: 4.2 X10(3)/MCL (ref 2.1–9.2)
NEUTROPHILS NFR BLD AUTO: 55.1 %
NITRITE UR QL STRIP: NEGATIVE
NRBC BLD AUTO-RTO: 0 %
PH UR STRIP: 7 [PH]
PLATELET # BLD AUTO: 207 X10(3)/MCL (ref 130–400)
PMV BLD AUTO: 12.4 FL (ref 7.4–10.4)
POTASSIUM SERPL-SCNC: 3.6 MMOL/L (ref 3.5–5.1)
PROT SERPL-MCNC: 7.8 GM/DL (ref 6.4–8.3)
PROT UR QL STRIP: NEGATIVE
RBC # BLD AUTO: 5.77 X10(6)/MCL (ref 4.2–5.4)
SODIUM SERPL-SCNC: 142 MMOL/L (ref 136–145)
SP GR UR STRIP.AUTO: 1.01 (ref 1–1.03)
UROBILINOGEN UR STRIP-ACNC: 0.2
WBC # BLD AUTO: 7.61 X10(3)/MCL (ref 4.5–11.5)

## 2025-06-07 PROCEDURE — 81003 URINALYSIS AUTO W/O SCOPE: CPT | Performed by: EMERGENCY MEDICINE

## 2025-06-07 PROCEDURE — 63600175 PHARM REV CODE 636 W HCPCS: Performed by: EMERGENCY MEDICINE

## 2025-06-07 PROCEDURE — 85025 COMPLETE CBC W/AUTO DIFF WBC: CPT | Performed by: EMERGENCY MEDICINE

## 2025-06-07 PROCEDURE — 96374 THER/PROPH/DIAG INJ IV PUSH: CPT

## 2025-06-07 PROCEDURE — 83735 ASSAY OF MAGNESIUM: CPT | Performed by: EMERGENCY MEDICINE

## 2025-06-07 PROCEDURE — 80053 COMPREHEN METABOLIC PANEL: CPT | Performed by: EMERGENCY MEDICINE

## 2025-06-07 PROCEDURE — 83690 ASSAY OF LIPASE: CPT | Performed by: EMERGENCY MEDICINE

## 2025-06-07 PROCEDURE — 96375 TX/PRO/DX INJ NEW DRUG ADDON: CPT

## 2025-06-07 PROCEDURE — 99284 EMERGENCY DEPT VISIT MOD MDM: CPT | Mod: 25

## 2025-06-07 RX ORDER — TRAMADOL HYDROCHLORIDE 50 MG/1
50 TABLET, FILM COATED ORAL EVERY 6 HOURS PRN
Qty: 20 TABLET | Refills: 0 | Status: SHIPPED | OUTPATIENT
Start: 2025-06-07 | End: 2025-06-07

## 2025-06-07 RX ORDER — ONDANSETRON HYDROCHLORIDE 2 MG/ML
4 INJECTION, SOLUTION INTRAVENOUS
Status: COMPLETED | OUTPATIENT
Start: 2025-06-07 | End: 2025-06-07

## 2025-06-07 RX ORDER — DIPHENOXYLATE HYDROCHLORIDE AND ATROPINE SULFATE 2.5; .025 MG/1; MG/1
2 TABLET ORAL 4 TIMES DAILY PRN
Qty: 24 TABLET | Refills: 0 | Status: SHIPPED | OUTPATIENT
Start: 2025-06-07 | End: 2025-06-10

## 2025-06-07 RX ORDER — MORPHINE SULFATE 4 MG/ML
4 INJECTION, SOLUTION INTRAMUSCULAR; INTRAVENOUS
Refills: 0 | Status: COMPLETED | OUTPATIENT
Start: 2025-06-07 | End: 2025-06-07

## 2025-06-07 RX ORDER — TRAMADOL HYDROCHLORIDE 50 MG/1
50 TABLET, FILM COATED ORAL EVERY 6 HOURS PRN
Qty: 20 TABLET | Refills: 0 | Status: SHIPPED | OUTPATIENT
Start: 2025-06-07 | End: 2025-06-12

## 2025-06-07 RX ORDER — DIPHENOXYLATE HYDROCHLORIDE AND ATROPINE SULFATE 2.5; .025 MG/1; MG/1
2 TABLET ORAL 4 TIMES DAILY PRN
Qty: 24 TABLET | Refills: 0 | Status: SHIPPED | OUTPATIENT
Start: 2025-06-07 | End: 2025-06-07

## 2025-06-07 RX ADMIN — ONDANSETRON 4 MG: 2 INJECTION INTRAMUSCULAR; INTRAVENOUS at 12:06

## 2025-06-07 RX ADMIN — MORPHINE SULFATE 4 MG: 4 INJECTION, SOLUTION INTRAMUSCULAR; INTRAVENOUS at 12:06

## 2025-06-07 NOTE — ED PROVIDER NOTES
Encounter Date: 2025       History     Chief Complaint   Patient presents with    Abdominal Pain     Pt states she has a hernia to her rt abd and is having pain since this am in area.  Pt also reports having diarrhea x 2 weeks.     The history is provided by the patient.   Abdominal Pain  The current episode started today. The onset of the illness was gradual. The abdominal pain is located in the periumbilical region. The abdominal pain does not radiate. The abdominal pain is relieved by nothing. The other symptoms of the illness include diarrhea. The other symptoms of the illness do not include fever, shortness of breath, nausea, vomiting or dysuria.   The diarrhea began more than 1 week ago. The diarrhea is watery.   Symptoms associated with the illness do not include hematuria, frequency or back pain.   Has known umbilical hernia diagnosed a few months ago.    Review of patient's allergies indicates:   Allergen Reactions    Latex     Latex, natural rubber Rash    Pcn [penicillins] Rash    Sulfa (sulfonamide antibiotics) Rash     Past Medical History:   Diagnosis Date    Seizures      Past Surgical History:   Procedure Laterality Date     SECTION      x2    CYST REMOVAL      from back    STRABISMUS SURGERY Bilateral 2018    Procedure: STRABISMUS SURGERY;  Surgeon: MAC Canchola Jr., MD;  Location: [facility] OR [address] [place] [address];  Service: Ophthalmology;  Laterality: Bilateral;     Family History   Problem Relation Name Age of Onset    Diabetes type II Mother      Hypertension Mother      Hypertension Father       Social History[1]  Review of Systems   Constitutional:  Negative for fever.   HENT:  Negative for sore throat.    Respiratory:  Negative for shortness of breath.    Cardiovascular:  Negative for chest pain.   Gastrointestinal:  Positive for abdominal pain and diarrhea. Negative for nausea and vomiting.   Genitourinary:  Negative for dysuria, frequency and hematuria.   Musculoskeletal:  Negative  for back pain.   Skin:  Negative for rash.   Neurological:  Negative for weakness.   Hematological:  Does not bruise/bleed easily.       Physical Exam     Initial Vitals [06/07/25 1143]   BP Pulse Resp Temp SpO2   (!) 156/102 98 18 97.9 °F (36.6 °C) 98 %      MAP       --         Physical Exam    Nursing note and vitals reviewed.  Constitutional: She appears well-developed and well-nourished.   HENT:   Head: Normocephalic and atraumatic.   Right Ear: External ear normal.   Left Ear: External ear normal.   Eyes: Conjunctivae and EOM are normal. Pupils are equal, round, and reactive to light.   Neck: Neck supple.   Normal range of motion.  Cardiovascular:  Normal rate, regular rhythm, normal heart sounds and intact distal pulses.           Pulmonary/Chest: Breath sounds normal.   Abdominal: Abdomen is soft. Bowel sounds are normal.   Small reducible umbilical hernia.  Tender to palpation.  No skin changes       Musculoskeletal:         General: Normal range of motion.      Cervical back: Normal range of motion and neck supple.     Neurological: She is alert and oriented to person, place, and time. GCS score is 15. GCS eye subscore is 4. GCS verbal subscore is 5. GCS motor subscore is 6.   Skin: Skin is warm and dry. Capillary refill takes less than 2 seconds.   Psychiatric: She has a normal mood and affect. Her behavior is normal. Judgment and thought content normal.         ED Course   Procedures  Labs Reviewed   COMPREHENSIVE METABOLIC PANEL - Abnormal       Result Value    Sodium 142      Potassium 3.6      Chloride 109 (*)     CO2 24      Glucose 82      Blood Urea Nitrogen 9.0 (*)     Creatinine 0.68      Calcium 9.6      Protein Total 7.8      Albumin 3.6      Globulin 4.2 (*)     Albumin/Globulin Ratio 0.9 (*)     Bilirubin Total 0.7      ALP 88      ALT 24      AST 25      eGFR >60      Anion Gap 9.0      BUN/Creatinine Ratio 13     CBC WITH DIFFERENTIAL - Abnormal    WBC 7.61      RBC 5.77 (*)     Hgb 15.6       Hct 50.2 (*)     MCV 87.0      MCH 27.0      MCHC 31.1 (*)     RDW 14.6      Platelet 207      MPV 12.4 (*)     Neut % 55.1      Lymph % 37.1      Mono % 5.7      Eos % 1.2      Basophil % 0.5      Imm Grans % 0.4      Neut # 4.20      Lymph # 2.82      Mono # 0.43      Eos # 0.09      Baso # 0.04      Imm Gran # 0.03      NRBC% 0.0     LIPASE - Normal    Lipase Level 20     MAGNESIUM - Normal    Magnesium Level 2.20     URINALYSIS, REFLEX TO URINE CULTURE - Normal    Color, UA Yellow      Appearance, UA Clear      Specific Gravity, UA 1.010      pH, UA 7.0      Protein, UA Negative      Glucose, UA Negative      Ketones, UA Negative      Blood, UA Negative      Bilirubin, UA Negative      Urobilinogen, UA 0.2      Nitrites, UA Negative      Leukocyte Esterase, UA Negative     CBC W/ AUTO DIFFERENTIAL    Narrative:     The following orders were created for panel order CBC auto differential.  Procedure                               Abnormality         Status                     ---------                               -----------         ------                     CBC with Differential[[id number]]       Abnormal            Final result                 Please view results for these tests on the individual orders.          Imaging Results    None          Medications   morphine injection 4 mg (4 mg Intravenous Given 6/7/25 1255)   ondansetron injection 4 mg (4 mg Intravenous Given 6/7/25 1256)     Medical Decision Making  Amount and/or Complexity of Data Reviewed  External Data Reviewed: radiology.     Details: EXAMINATION:  CT ABDOMEN PELVIS WITH IV CONTRAST     CLINICAL HISTORY:  Abdominal abscess/infection suspected;     TECHNIQUE:  Low dose axial images, sagittal and coronal reformations were obtained from the lung bases to the pubic symphysis following the IV administration of contrast. Automatic exposure control (AEC) is utilized to reduce patient radiation exposure.     COMPARISON:  03/29/2024      FINDINGS:  The lung bases are clear of acute infiltrates.  There is a punctate 3 mm nodule in the right lower lobe adjacent to the major fissure on image number 1 series 4.  This is stable since the prior examination.     There is evidence of hepatic steatosis..  No liver mass or lesion is seen.  Portal and hepatic veins appear normal.     The patient is status post cholecystectomy..     The pancreas appears normal.  No pancreatic mass or lesion is seen.     The spleen shows no acute abnormality.     The adrenal glands appear normal.  No adrenal nodule is seen.     The kidneys appear normal.  There is a simple cyst in the upper pole the right kidney.  No hydronephrosis is seen.  No hydroureter is seen.  No nephrolithiasis is seen.  No obvious ureteral stones are seen.     Urinary bladder appears grossly unremarkable.     There has been interval development of an anterior abdominal wall supraumbilical hernia in the midline.  It is seen on images number 54 through 60 of series 2.  The hernia contains some epiploic fat and some vasculature and there are inflammatory changes seen associated with it consistent with vascular compromise/strangulation.  The transverse colon is adjacent to the hernia but is not herniated.     No colitis is seen.  No diverticulitis is seen.  No obvious colonic mass or lesion is seen.  No appendicitis is seen.     Uterus appears unremarkable.     No free air is seen.  No free fluid is seen.     Impression:     Interval development of what appears to be a strangulated anterior abdominal wall omental hernia.  There is some fat and vasculature within this hernia with associated inflammatory changes suggesting vascular compromise.  Surgical consultation is recommended.     Hepatic steatosis     Punctate benign-appearing 3 mm nodule in the right lower lobe stable since prior examination        Electronically signed by:Augustin Liz  Date:                                             04/11/2025  Time:                                           12:37      Excerpt from surgery consult from 4/2025:    ASSESSMENT & PLAN:   54 y.o. female here with supraumbilical hernia that was reduced in ED prior to evaluation. No need for acute surgical intervention. Discussed need for eventual repair but due to elevated BMI patient is at risk for recurrence and encouraged patient to lose ~15 lbs to reduce risk of recurrence. Discussed smoking cessation as well. Additionally encouraged patient to obtain health insurance prior to hernia repair. Return precautions given. Patient and son at bedside expressed understanding and is amenable to plan. All questions were answered.     - Recommend PO challenge in ED, can be discharged homeif passes  - Weight loss  - Smoking cessation  - Insurance discussion  - Outpatient follow-up with general surgery in 3 months     Junior Virgen MD  [facility], PGY-1     Cosigned by: Mir Garcia Jr., MD at 4/11/2025  2:16 PM  Electronically signed by Junior Virgen MD at 4/11/2025  1:39 PM  Electronically signed by Mir Garcia Jr., MD at 4/11/2025  2:16 PM    Labs: ordered. Decision-making details documented in ED Course.    Risk  Prescription drug management.  Parenteral controlled substances.    Differential includes:  enteritis, colitis, hernia pain, pancreatitis, UTI, strain, diverticular disease.  Will obtain CBC, CMP, lipase, mag, UA and give analgesic, antiemetic.           ED Course as of 06/07/25 1300   Sat Jun 07, 2025   1256 Discussed results with patient.  No clinical evidence of obstruction.  Needs surgery referral, though she was seen by surgery in the ED 2 months ago.  Weight loss prior to surgery recommended.  Will send referral. [CL]      ED Course User Index  [CL] Ej Ford MD                           Clinical Impression:  Final diagnoses:  [K43.9] Ventral hernia without obstruction or gangrene (Primary)  [R10.33]  Periumbilical abdominal pain  [R19.7] Diarrhea, unspecified type          ED Disposition Condition    Discharge Stable          ED Prescriptions       Medication Sig Dispense Start Date End Date Auth. Provider    traMADoL (ULTRAM) 50 mg tablet Take 1 tablet (50 mg total) by mouth every 6 (six) hours as needed for Pain. Final diagnoses: [K43.9] Ventral hernia without obstruction or gangrene (Primary) [R10.33] Periumbilical abdominal pain [R19.7] Diarrhea, unspecified type 20 tablet 6/7/2025 6/12/2025 Ej Ford MD    diphenoxylate-atropine 2.5-0.025 mg (LOMOTIL) 2.5-0.025 mg per tablet Take 2 tablets by mouth 4 (four) times daily as needed for Diarrhea. 24 tablet 6/7/2025 6/10/2025 Ej Ford MD          Follow-up Information    None                [1]   Social History  Tobacco Use    Smoking status: Every Day     Current packs/day: 1.00     Types: Cigarettes    Smokeless tobacco: Never   Substance Use Topics    Alcohol use: No    Drug use: No        Ej Ford MD  06/07/25 1300

## 2025-06-07 NOTE — Clinical Note
"Erlinda Rios" [facility] was seen and treated in our emergency department on 6/7/2025.  She may return to work on 06/09/2025.       If you have any questions or concerns, please don't hesitate to call.       RN    "

## 2025-06-10 ENCOUNTER — HOSPITAL ENCOUNTER (EMERGENCY)
Facility: HOSPITAL | Age: 55
Discharge: HOME | End: 2025-06-10
Attending: EMERGENCY MEDICINE
Payer: MEDICAID

## 2025-06-10 VITALS
WEIGHT: 176 LBS | HEIGHT: 61 IN | OXYGEN SATURATION: 100 % | TEMPERATURE: 98 F | HEART RATE: 75 BPM | RESPIRATION RATE: 20 BRPM | SYSTOLIC BLOOD PRESSURE: 150 MMHG | DIASTOLIC BLOOD PRESSURE: 95 MMHG | BODY MASS INDEX: 33.23 KG/M2

## 2025-06-10 DIAGNOSIS — K43.9 VENTRAL HERNIA WITHOUT OBSTRUCTION OR GANGRENE: Primary | ICD-10-CM

## 2025-06-10 LAB
ALBUMIN SERPL-MCNC: 3.6 G/DL (ref 3.5–5)
ALBUMIN/GLOB SERPL: 0.9 RATIO (ref 1.1–2)
ALP SERPL-CCNC: 82 UNIT/L (ref 40–150)
ALT SERPL-CCNC: 24 UNIT/L (ref 0–55)
ANION GAP SERPL CALC-SCNC: 9 MEQ/L
AST SERPL-CCNC: 25 UNIT/L (ref 11–45)
BASOPHILS # BLD AUTO: 0.05 X10(3)/MCL
BASOPHILS NFR BLD AUTO: 0.6 %
BILIRUB SERPL-MCNC: 0.6 MG/DL
BUN SERPL-MCNC: 9 MG/DL (ref 9.8–20.1)
CALCIUM SERPL-MCNC: 9.3 MG/DL (ref 8.4–10.2)
CHLORIDE SERPL-SCNC: 106 MMOL/L (ref 98–107)
CO2 SERPL-SCNC: 25 MMOL/L (ref 22–29)
CREAT SERPL-MCNC: 0.74 MG/DL (ref 0.55–1.02)
CREAT/UREA NIT SERPL: 12
EOSINOPHIL # BLD AUTO: 0.13 X10(3)/MCL (ref 0–0.9)
EOSINOPHIL NFR BLD AUTO: 1.6 %
ERYTHROCYTE [DISTWIDTH] IN BLOOD BY AUTOMATED COUNT: 14.6 % (ref 11.5–17)
GFR SERPLBLD CREATININE-BSD FMLA CKD-EPI: >60 ML/MIN/1.73/M2
GLOBULIN SER-MCNC: 4.1 GM/DL (ref 2.4–3.5)
GLUCOSE SERPL-MCNC: 73 MG/DL (ref 74–100)
HCT VFR BLD AUTO: 48.6 % (ref 37–47)
HGB BLD-MCNC: 15.2 G/DL (ref 12–16)
IMM GRANULOCYTES # BLD AUTO: 0.02 X10(3)/MCL (ref 0–0.04)
IMM GRANULOCYTES NFR BLD AUTO: 0.3 %
LACTATE SERPL-SCNC: 2.1 MMOL/L (ref 0.5–2.2)
LYMPHOCYTES # BLD AUTO: 3.32 X10(3)/MCL (ref 0.6–4.6)
LYMPHOCYTES NFR BLD AUTO: 41.9 %
MCH RBC QN AUTO: 27.2 PG (ref 27–31)
MCHC RBC AUTO-ENTMCNC: 31.3 G/DL (ref 33–36)
MCV RBC AUTO: 86.9 FL (ref 80–94)
MONOCYTES # BLD AUTO: 0.45 X10(3)/MCL (ref 0.1–1.3)
MONOCYTES NFR BLD AUTO: 5.7 %
NEUTROPHILS # BLD AUTO: 3.95 X10(3)/MCL (ref 2.1–9.2)
NEUTROPHILS NFR BLD AUTO: 49.9 %
NRBC BLD AUTO-RTO: 0 %
PLATELET # BLD AUTO: 187 X10(3)/MCL (ref 130–400)
PMV BLD AUTO: 12.5 FL (ref 7.4–10.4)
POTASSIUM SERPL-SCNC: 3.8 MMOL/L (ref 3.5–5.1)
PROT SERPL-MCNC: 7.7 GM/DL (ref 6.4–8.3)
RBC # BLD AUTO: 5.59 X10(6)/MCL (ref 4.2–5.4)
SODIUM SERPL-SCNC: 140 MMOL/L (ref 136–145)
WBC # BLD AUTO: 7.92 X10(3)/MCL (ref 4.5–11.5)

## 2025-06-10 PROCEDURE — 63600175 PHARM REV CODE 636 W HCPCS: Performed by: EMERGENCY MEDICINE

## 2025-06-10 PROCEDURE — 80053 COMPREHEN METABOLIC PANEL: CPT | Performed by: EMERGENCY MEDICINE

## 2025-06-10 PROCEDURE — 96374 THER/PROPH/DIAG INJ IV PUSH: CPT | Mod: 59

## 2025-06-10 PROCEDURE — 25500020 PHARM REV CODE 255: Performed by: EMERGENCY MEDICINE

## 2025-06-10 PROCEDURE — 25000003 PHARM REV CODE 250: Performed by: EMERGENCY MEDICINE

## 2025-06-10 PROCEDURE — 96361 HYDRATE IV INFUSION ADD-ON: CPT

## 2025-06-10 PROCEDURE — 85025 COMPLETE CBC W/AUTO DIFF WBC: CPT | Performed by: EMERGENCY MEDICINE

## 2025-06-10 PROCEDURE — 99285 EMERGENCY DEPT VISIT HI MDM: CPT | Mod: 25

## 2025-06-10 PROCEDURE — 83605 ASSAY OF LACTIC ACID: CPT | Performed by: EMERGENCY MEDICINE

## 2025-06-10 RX ORDER — MORPHINE SULFATE 4 MG/ML
4 INJECTION, SOLUTION INTRAMUSCULAR; INTRAVENOUS
Refills: 0 | Status: COMPLETED | OUTPATIENT
Start: 2025-06-10 | End: 2025-06-10

## 2025-06-10 RX ORDER — IOPAMIDOL 755 MG/ML
100 INJECTION, SOLUTION INTRAVASCULAR
Status: COMPLETED | OUTPATIENT
Start: 2025-06-10 | End: 2025-06-10

## 2025-06-10 RX ADMIN — IOPAMIDOL 100 ML: 755 INJECTION, SOLUTION INTRAVENOUS at 06:06

## 2025-06-10 RX ADMIN — MORPHINE SULFATE 4 MG: 4 INJECTION, SOLUTION INTRAMUSCULAR; INTRAVENOUS at 05:06

## 2025-06-10 RX ADMIN — SODIUM CHLORIDE 1000 ML: 9 INJECTION, SOLUTION INTRAVENOUS at 05:06

## 2025-06-10 NOTE — ED PROVIDER NOTES
Encounter Date: 6/10/2025       History     Chief Complaint   Patient presents with    Abdominal Pain     Abd pain from hernia   supposed to see sx today but didn't go     The patient has a history of a supraumbilical ventral wall hernia.  She has been in the ER 2 months ago and then 3 days ago.  Three days ago the hernia was easily reducible.  Today she developed more abdominal pain and swelling that has not improved.  No vomiting.  No diarrhea.  She has had a  and cholecystectomy.  The pain is quite intense.  She took a tramadol without much relief earlier today.        Review of patient's allergies indicates:   Allergen Reactions    Latex     Latex, natural rubber Rash    Pcn [penicillins] Rash    Sulfa (sulfonamide antibiotics) Rash     Past Medical History:   Diagnosis Date    Seizures      Past Surgical History:   Procedure Laterality Date     SECTION      x2    CYST REMOVAL      from back    STRABISMUS SURGERY Bilateral 2018    Procedure: STRABISMUS SURGERY;  Surgeon: MAC Canchola Jr., MD;  Location: [facility] OR [address] [place] [address];  Service: Ophthalmology;  Laterality: Bilateral;     Family History   Problem Relation Name Age of Onset    Diabetes type II Mother      Hypertension Mother      Hypertension Father       Social History[1]  Review of Systems   All other systems reviewed and are negative.      Physical Exam     Initial Vitals [06/10/25 1633]   BP Pulse Resp Temp SpO2   (!) 147/96 84 16 98.4 °F (36.9 °C) 99 %      MAP       --         Physical Exam    Constitutional:   Vital signs reviewed.  Nursing note reviewed.    General:  The patient is awake and alert, appropriate and interactive.  Uncomfortable, appears to be in pain.  Eyes: Conjunctiva are clear.  Corneas appear normal.  EOMI.  ENT: Face, head, external nose, and ears are normal-appearing.  The oropharynx appears normal.  The uvula is midline.  The posterior pharyngeal elements are symmetric.  Voice is normal.  Mucous  membranes moist.  Neck: The neck is nontender and supple with normal range of motion.  Back: The back appears normal with full range of motion.  Respiratory: The respiratory effort is normal.  The lungs are clear to auscultation.  Cardiovascular: Heart sounds are normal.  No murmur or rub.  The rate is normal.  The rhythm is normal.  Peripheral pulses are normal and equal bilaterally.  No edema is present.  Capillary refill is less than 3 seconds.  GI: There is moderate to severe tenderness to palpation in the right superior umbilical area.  There is swelling in the area.  No rebound or guarding.  Bowel sounds are diminished.  The liver and spleen are nonpalpable.  Musculoskeletal: Range of motion of all joints appears normal without pain or tenderness.  Skin: There is no rash.  Neurologic: No focal deficits are noted.           ED Course   Procedures  Labs Reviewed   COMPREHENSIVE METABOLIC PANEL - Abnormal       Result Value    Sodium 140      Potassium 3.8      Chloride 106      CO2 25      Glucose 73 (*)     Blood Urea Nitrogen 9.0 (*)     Creatinine 0.74      Calcium 9.3      Protein Total 7.7      Albumin 3.6      Globulin 4.1 (*)     Albumin/Globulin Ratio 0.9 (*)     Bilirubin Total 0.6      ALP 82      ALT 24      AST 25      eGFR >60      Anion Gap 9.0      BUN/Creatinine Ratio 12     CBC WITH DIFFERENTIAL - Abnormal    WBC 7.92      RBC 5.59 (*)     Hgb 15.2      Hct 48.6 (*)     MCV 86.9      MCH 27.2      MCHC 31.3 (*)     RDW 14.6      Platelet 187      MPV 12.5 (*)     Neut % 49.9      Lymph % 41.9      Mono % 5.7      Eos % 1.6      Basophil % 0.6      Imm Grans % 0.3      Neut # 3.95      Lymph # 3.32      Mono # 0.45      Eos # 0.13      Baso # 0.05      Imm Gran # 0.02      NRBC% 0.0     LACTIC ACID, PLASMA - Normal    Lactic Acid Level 2.1     CBC W/ AUTO DIFFERENTIAL    Narrative:     The following orders were created for panel order CBC auto differential.  Procedure                                Abnormality         Status                     ---------                               -----------         ------                     CBC with Differential[[id number]]       Abnormal            Final result                 Please view results for these tests on the individual orders.   LACTIC ACID, PLASMA          Imaging Results              CT Abdomen Pelvis With IV Contrast NO Oral Contrast (Final result)  Result time 06/10/25 18:56:12      Final result by Tian Moreno MD (06/10/25 18:56:12)                   Impression:      Fat containing hernia as described above.  Associated inflammatory changes are identified.  Other secondary findings as noted.      Electronically signed by: Tian Moreno MD  Date:    06/10/2025  Time:    18:56               Narrative:    EXAMINATION:  CT ABDOMEN PELVIS WITH IV CONTRAST    CLINICAL HISTORY:  ventral hernia;    TECHNIQUE:  Multiple cross-section images were obtained from lung bases the pubic symphysis after the intravenous administration of 100 mL of Omnipaque 350.  Coronal and sagittal reformatted images were obtained.  An automated dose exposure technique was utilized.  This limits radiation does the patient.    COMPARISON:  04/11/2025    FINDINGS:  Dependent atelectatic changes lungs with bibasilar scarring.  Heart size within normal limits.    Liver is enlarged and diffusely hypodense consistent hepatic steatosis.  Gallbladder is surgically absent.  Spleen, adrenals, and pancreas are normal.  Bosniak type 1 cyst is identified on the left.  Kidneys are symmetric.  Minimal intrahepatic ductal dilatation is identified of pancreas without gland atrophy.    Debris-filled gastric body.  Small bowel is normal caliber.  Colon is also normal caliber with scattered colonic diverticula.  No adjacent inflammatory changes.  Moderate to large stool burden is identified throughout the colon without adjacent inflammatory changes.  Normal appendix.    The uterus is anteverted.  No  evidence for adnexal mass.  The bladder is under distended normal.  No free fluid in the pelvis.  The course and caliber of the abdominal aorta is normal.    No suspicious osseous lesions.  Spondylotic changes are identified.  A ventral wall hernia is identified containing mesenteric fat/omentum.  This is in the supraumbilical region.  Image number 70 of series 2.                                       Medications   morphine injection 4 mg (4 mg Intravenous Given 6/10/25 1742)   sodium chloride 0.9% bolus 1,000 mL 1,000 mL (1,000 mLs Intravenous New Bag 6/10/25 1742)   iopamidoL (ISOVUE-370) injection 100 mL (100 mLs Intravenous Given 6/10/25 1818)     Medical Decision Making  I attempted gentle reduction of the hernia.  However, the patient is having a significant amount of pain and I am unable to achieve reduction of the hernia.  The plan is to medicate her and obtain a CT scan and reassess.    Care transitioned to Dr. Mas at 1800.    Problems Addressed:  Ventral hernia without obstruction or gangrene: chronic illness or injury    Amount and/or Complexity of Data Reviewed  Labs: ordered.  Radiology: ordered.    Risk  Prescription drug management.               ED Course as of 06/10/25 1905   Tue Saud 10, 2025   1902 ISagar M.D., have assumed care this patient at 6:00 p.m..  This is a 54-year-old female with a known abdominal hernia.  She came in for abdominal pain.  CT scan was obtained.  Bedside assessment with right ventral hernia.  Patient was placed in Trendelenburg and hernia was easily reducible.  Labs normal.  Awaiting CT report [BS]   1905 Hernia successfully reduced.  Informed patient the importance of following up with surgeon.  She had appointment today that she did not make.  She states that she will make another appointment.  I instructed patient on ways that she can self reduce the hernia at home. [BS]      ED Course User Index  [BS] Sagar Mas MD                            Clinical Impression:  Final diagnoses:  [K43.9] Ventral hernia without obstruction or gangrene (Primary)          ED Disposition Condition    Discharge Stable          ED Prescriptions    None       Follow-up Information       Follow up With Specialties Details Why Contact Info    [facility] - Emergency Dept Emergency Medicine Go to  If symptoms worsen [address]  [facility] [address]  [phone]    Follow up with surgeon.  Called them for another appointment since you missed the appointment today                       [1]   Social History  Tobacco Use    Smoking status: Every Day     Current packs/day: 1.00     Types: Cigarettes    Smokeless tobacco: Never   Substance Use Topics    Alcohol use: No    Drug use: No        Sagar Mas MD  06/10/25 3644

## 2025-06-24 ENCOUNTER — LAB VISIT (OUTPATIENT)
Dept: LAB | Facility: HOSPITAL | Age: 55
End: 2025-06-24
Attending: SURGERY
Payer: MEDICAID

## 2025-06-24 ENCOUNTER — HOSPITAL ENCOUNTER (OUTPATIENT)
Dept: RADIOLOGY | Facility: HOSPITAL | Age: 55
Discharge: HOME | End: 2025-06-24
Attending: SURGERY
Payer: MEDICAID

## 2025-06-24 DIAGNOSIS — Z12.11 SPECIAL SCREENING FOR MALIGNANT NEOPLASMS, COLON: Primary | ICD-10-CM

## 2025-06-24 DIAGNOSIS — K42.9 UMBILICAL HERNIA: ICD-10-CM

## 2025-06-24 LAB
HEMOCCULT SP1 STL QL: NEGATIVE
HEMOCCULT SP2 STL QL: NEGATIVE
HEMOCCULT SP3 STL QL: NEGATIVE

## 2025-06-24 PROCEDURE — 76856 US EXAM PELVIC COMPLETE: CPT | Mod: TC

## 2025-06-24 PROCEDURE — 76700 US EXAM ABDOM COMPLETE: CPT | Mod: TC

## 2025-06-24 PROCEDURE — 82270 OCCULT BLOOD FECES: CPT

## 2025-06-30 ENCOUNTER — ANESTHESIA EVENT (OUTPATIENT)
Dept: SURGERY | Facility: HOSPITAL | Age: 55
End: 2025-06-30
Payer: MEDICAID

## 2025-06-30 ENCOUNTER — HOSPITAL ENCOUNTER (OUTPATIENT)
Dept: RADIOLOGY | Facility: HOSPITAL | Age: 55
Discharge: HOME | End: 2025-06-30
Attending: SURGERY
Payer: MEDICAID

## 2025-06-30 ENCOUNTER — CLINICAL SUPPORT (OUTPATIENT)
Dept: RESPIRATORY THERAPY | Facility: HOSPITAL | Age: 55
End: 2025-06-30
Attending: SURGERY
Payer: MEDICAID

## 2025-06-30 DIAGNOSIS — K42.9 FUNICULAR HERNIA OF UMBILICAL CORD: Primary | ICD-10-CM

## 2025-06-30 DIAGNOSIS — K42.9 FUNICULAR HERNIA OF UMBILICAL CORD: ICD-10-CM

## 2025-06-30 DIAGNOSIS — Z01.811 PRE-OP CHEST EXAM: ICD-10-CM

## 2025-06-30 LAB
OHS QRS DURATION: 82 MS
OHS QTC CALCULATION: 479 MS

## 2025-06-30 PROCEDURE — 93010 ELECTROCARDIOGRAM REPORT: CPT | Mod: ,,, | Performed by: INTERNAL MEDICINE

## 2025-06-30 PROCEDURE — 71046 X-RAY EXAM CHEST 2 VIEWS: CPT | Mod: TC

## 2025-06-30 PROCEDURE — 93005 ELECTROCARDIOGRAM TRACING: CPT

## 2025-06-30 NOTE — DISCHARGE INSTRUCTIONS
Follow prep on Wednesday. Clear liquids only. Nothing by mouth after midnight.                           DR. BRAMBILA POST OP INSTRUCTIONS       STARTING TOMORROW, SHOWER NO BATHS. CLEAN INCISIONS DAILY   WITH SOAP AND WATER. KEEP CLEAN AND DRY. NO HEAVY LIFTING OR DRIVING   UNTIL RELEASED BY DR BRAMBILA. NOTIFY YOUR DOCTOR WITH ANY FEVER   GREATER THAN 101, REDNESS OR DRAINAGE AT INCISION SITE, EXCESSIVE PAIN,                                  OR  EXCESSIVE BLEEDING .

## 2025-06-30 NOTE — ANESTHESIA PREPROCEDURE EVALUATION
06/30/2025  Erlinda Alexander is a 54 y.o., female.      Pre-op Assessment    I have reviewed the Patient Summary Reports.     I have reviewed the Nursing Notes. I have reviewed the NPO Status.      Review of Systems  Anesthesia Hx:  No problems with previous Anesthesia   Neg history of prior surgery.          Denies Family Hx of Anesthesia complications.    Denies Personal Hx of Anesthesia complications.                    Social:  Smoker       Hematology/Oncology:  Hematology Normal   Oncology Normal                                   EENT/Dental:  EENT/Dental Normal           Cardiovascular:     Hypertension                                          Pulmonary:  Pulmonary Normal                       Renal/:  Renal/ Normal                 Hepatic/GI:  Hepatic/GI Normal                    Musculoskeletal:  Musculoskeletal Normal                Neurological:       Seizures                                Endocrine:        Obesity / BMI > 30  Dermatological:  Skin Normal    Psych:  Psychiatric Normal                    Physical Exam  General: Cooperative and Alert    Airway:  Mallampati: I   Mouth Opening: Normal  TM Distance: Normal  Tongue: Normal  Neck ROM: Normal ROM    Dental:  Intact        Anesthesia Plan  Type of Anesthesia, risks & benefits discussed:    Anesthesia Type: Gen ETT  Intra-op Monitoring Plan: Standard ASA Monitors  Post Op Pain Control Plan: multimodal analgesia  Induction:  IV  Airway Plan: Direct  Informed Consent: Informed consent signed with the Patient and all parties understand the risks and agree with anesthesia plan.  All questions answered. Patient consented to blood products? Yes  ASA Score: 2  Day of Surgery Review of History & Physical: I have interviewed and examined the patient. I have reviewed the patient's H&P dated: There are no significant changes.     Ready For  Surgery From Anesthesia Perspective.     .

## 2025-07-03 ENCOUNTER — ANESTHESIA (OUTPATIENT)
Dept: SURGERY | Facility: HOSPITAL | Age: 55
End: 2025-07-03
Payer: MEDICAID

## 2025-07-03 ENCOUNTER — HOSPITAL ENCOUNTER (OUTPATIENT)
Facility: HOSPITAL | Age: 55
Discharge: HOME | End: 2025-07-03
Attending: SURGERY | Admitting: SURGERY
Payer: MEDICAID

## 2025-07-03 VITALS
SYSTOLIC BLOOD PRESSURE: 102 MMHG | HEIGHT: 61 IN | HEART RATE: 89 BPM | RESPIRATION RATE: 18 BRPM | OXYGEN SATURATION: 97 % | TEMPERATURE: 98 F | WEIGHT: 175 LBS | DIASTOLIC BLOOD PRESSURE: 65 MMHG | BODY MASS INDEX: 33.04 KG/M2

## 2025-07-03 DIAGNOSIS — K43.9 SUPRAUMBILICAL HERNIA: ICD-10-CM

## 2025-07-03 PROCEDURE — 71000015 HC POSTOP RECOV 1ST HR: Performed by: SURGERY

## 2025-07-03 PROCEDURE — 36000710: Performed by: SURGERY

## 2025-07-03 PROCEDURE — 25000003 PHARM REV CODE 250: Performed by: ANESTHESIOLOGY

## 2025-07-03 PROCEDURE — 63600175 PHARM REV CODE 636 W HCPCS: Mod: JZ,TB | Performed by: NURSE ANESTHETIST, CERTIFIED REGISTERED

## 2025-07-03 PROCEDURE — 63600175 PHARM REV CODE 636 W HCPCS: Performed by: SURGERY

## 2025-07-03 PROCEDURE — 71000033 HC RECOVERY, INTIAL HOUR: Performed by: SURGERY

## 2025-07-03 PROCEDURE — 36000711: Performed by: SURGERY

## 2025-07-03 PROCEDURE — 71000039 HC RECOVERY, EACH ADD'L HOUR: Performed by: SURGERY

## 2025-07-03 PROCEDURE — 63600175 PHARM REV CODE 636 W HCPCS: Performed by: ANESTHESIOLOGY

## 2025-07-03 PROCEDURE — 63600175 PHARM REV CODE 636 W HCPCS: Performed by: NURSE ANESTHETIST, CERTIFIED REGISTERED

## 2025-07-03 PROCEDURE — C1781 MESH (IMPLANTABLE): HCPCS | Performed by: SURGERY

## 2025-07-03 PROCEDURE — 27201423 OPTIME MED/SURG SUP & DEVICES STERILE SUPPLY: Performed by: SURGERY

## 2025-07-03 PROCEDURE — 37000008 HC ANESTHESIA 1ST 15 MINUTES: Performed by: SURGERY

## 2025-07-03 PROCEDURE — 25000003 PHARM REV CODE 250: Performed by: SURGERY

## 2025-07-03 PROCEDURE — 37000009 HC ANESTHESIA EA ADD 15 MINS: Performed by: SURGERY

## 2025-07-03 PROCEDURE — 71000016 HC POSTOP RECOV ADDL HR: Performed by: SURGERY

## 2025-07-03 PROCEDURE — 25000003 PHARM REV CODE 250: Performed by: NURSE ANESTHETIST, CERTIFIED REGISTERED

## 2025-07-03 DEVICE — LAPAROSCOPIC SELF-FIXATING MESH POLYESTER WITH POLYLACTIC ACID GRIPS AND COLLAGEN FILM
Type: IMPLANTABLE DEVICE | Site: UMBILICAL | Status: FUNCTIONAL
Brand: PROGRIP

## 2025-07-03 RX ORDER — KETOROLAC TROMETHAMINE 30 MG/ML
15 INJECTION, SOLUTION INTRAMUSCULAR; INTRAVENOUS EVERY 6 HOURS PRN
Status: DISCONTINUED | OUTPATIENT
Start: 2025-07-03 | End: 2025-07-03 | Stop reason: HOSPADM

## 2025-07-03 RX ORDER — HYDROMORPHONE HYDROCHLORIDE 2 MG/ML
0.5 INJECTION, SOLUTION INTRAMUSCULAR; INTRAVENOUS; SUBCUTANEOUS EVERY 5 MIN PRN
Status: COMPLETED | OUTPATIENT
Start: 2025-07-03 | End: 2025-07-03

## 2025-07-03 RX ORDER — KETOROLAC TROMETHAMINE 30 MG/ML
15 INJECTION, SOLUTION INTRAMUSCULAR; INTRAVENOUS ONCE
Status: COMPLETED | OUTPATIENT
Start: 2025-07-03 | End: 2025-07-03

## 2025-07-03 RX ORDER — MIDAZOLAM HYDROCHLORIDE 1 MG/ML
INJECTION INTRAMUSCULAR; INTRAVENOUS
Status: DISCONTINUED | OUTPATIENT
Start: 2025-07-03 | End: 2025-07-03

## 2025-07-03 RX ORDER — HYDROMORPHONE HYDROCHLORIDE 2 MG/ML
0.2 INJECTION, SOLUTION INTRAMUSCULAR; INTRAVENOUS; SUBCUTANEOUS EVERY 5 MIN PRN
Status: DISCONTINUED | OUTPATIENT
Start: 2025-07-03 | End: 2025-07-03

## 2025-07-03 RX ORDER — ROCURONIUM BROMIDE 10 MG/ML
INJECTION, SOLUTION INTRAVENOUS
Status: DISCONTINUED | OUTPATIENT
Start: 2025-07-03 | End: 2025-07-03

## 2025-07-03 RX ORDER — CLINDAMYCIN PHOSPHATE 900 MG/50ML
900 INJECTION, SOLUTION INTRAVENOUS
Status: COMPLETED | OUTPATIENT
Start: 2025-07-03 | End: 2025-07-03

## 2025-07-03 RX ORDER — EPHEDRINE SULFATE 50 MG/ML
INJECTION, SOLUTION INTRAVENOUS
Status: DISCONTINUED | OUTPATIENT
Start: 2025-07-03 | End: 2025-07-03

## 2025-07-03 RX ORDER — GABAPENTIN 300 MG/1
300 CAPSULE ORAL
Status: DISCONTINUED | OUTPATIENT
Start: 2025-07-03 | End: 2025-07-03 | Stop reason: HOSPADM

## 2025-07-03 RX ORDER — MIDAZOLAM HYDROCHLORIDE 1 MG/ML
2 INJECTION INTRAMUSCULAR; INTRAVENOUS ONCE
Status: COMPLETED | OUTPATIENT
Start: 2025-07-03 | End: 2025-07-03

## 2025-07-03 RX ORDER — LIDOCAINE HYDROCHLORIDE 20 MG/ML
INJECTION, SOLUTION EPIDURAL; INFILTRATION; INTRACAUDAL; PERINEURAL
Status: DISCONTINUED | OUTPATIENT
Start: 2025-07-03 | End: 2025-07-03

## 2025-07-03 RX ORDER — BUPIVACAINE HYDROCHLORIDE 2.5 MG/ML
INJECTION, SOLUTION EPIDURAL; INFILTRATION; INTRACAUDAL; PERINEURAL
Status: DISCONTINUED | OUTPATIENT
Start: 2025-07-03 | End: 2025-07-03 | Stop reason: HOSPADM

## 2025-07-03 RX ORDER — GLUCAGON 1 MG
1 KIT INJECTION
Status: DISCONTINUED | OUTPATIENT
Start: 2025-07-03 | End: 2025-07-03

## 2025-07-03 RX ORDER — SODIUM CHLORIDE, SODIUM LACTATE, POTASSIUM CHLORIDE, CALCIUM CHLORIDE 600; 310; 30; 20 MG/100ML; MG/100ML; MG/100ML; MG/100ML
INJECTION, SOLUTION INTRAVENOUS CONTINUOUS
Status: DISCONTINUED | OUTPATIENT
Start: 2025-07-03 | End: 2025-07-03 | Stop reason: HOSPADM

## 2025-07-03 RX ORDER — HYDROCODONE BITARTRATE AND ACETAMINOPHEN 5; 325 MG/1; MG/1
1 TABLET ORAL EVERY 6 HOURS PRN
Refills: 0 | Status: DISCONTINUED | OUTPATIENT
Start: 2025-07-03 | End: 2025-07-03 | Stop reason: HOSPADM

## 2025-07-03 RX ORDER — ONDANSETRON 4 MG/1
8 TABLET, ORALLY DISINTEGRATING ORAL ONCE
Status: DISCONTINUED | OUTPATIENT
Start: 2025-07-03 | End: 2025-07-03 | Stop reason: HOSPADM

## 2025-07-03 RX ORDER — DEXAMETHASONE SODIUM PHOSPHATE 4 MG/ML
INJECTION, SOLUTION INTRA-ARTICULAR; INTRALESIONAL; INTRAMUSCULAR; INTRAVENOUS; SOFT TISSUE
Status: DISCONTINUED | OUTPATIENT
Start: 2025-07-03 | End: 2025-07-03

## 2025-07-03 RX ORDER — ONDANSETRON HYDROCHLORIDE 2 MG/ML
INJECTION, SOLUTION INTRAVENOUS
Status: DISCONTINUED | OUTPATIENT
Start: 2025-07-03 | End: 2025-07-03

## 2025-07-03 RX ORDER — FENTANYL CITRATE 50 UG/ML
INJECTION, SOLUTION INTRAMUSCULAR; INTRAVENOUS
Status: DISCONTINUED | OUTPATIENT
Start: 2025-07-03 | End: 2025-07-03

## 2025-07-03 RX ORDER — PROPOFOL 10 MG/ML
VIAL (ML) INTRAVENOUS
Status: DISCONTINUED | OUTPATIENT
Start: 2025-07-03 | End: 2025-07-03

## 2025-07-03 RX ORDER — ACETAMINOPHEN 500 MG
1000 TABLET ORAL
Status: COMPLETED | OUTPATIENT
Start: 2025-07-03 | End: 2025-07-03

## 2025-07-03 RX ORDER — HYDRALAZINE HYDROCHLORIDE 20 MG/ML
10 INJECTION INTRAMUSCULAR; INTRAVENOUS ONCE
Status: COMPLETED | OUTPATIENT
Start: 2025-07-03 | End: 2025-07-03

## 2025-07-03 RX ADMIN — KETOROLAC TROMETHAMINE 15 MG: 30 INJECTION, SOLUTION INTRAMUSCULAR; INTRAVENOUS at 04:07

## 2025-07-03 RX ADMIN — ROCURONIUM BROMIDE 20 MG: 10 INJECTION, SOLUTION INTRAVENOUS at 12:07

## 2025-07-03 RX ADMIN — DEXAMETHASONE SODIUM PHOSPHATE 4 MG: 4 INJECTION, SOLUTION INTRA-ARTICULAR; INTRALESIONAL; INTRAMUSCULAR; INTRAVENOUS; SOFT TISSUE at 11:07

## 2025-07-03 RX ADMIN — KETOROLAC TROMETHAMINE 15 MG: 30 INJECTION, SOLUTION INTRAMUSCULAR; INTRAVENOUS at 02:07

## 2025-07-03 RX ADMIN — FENTANYL CITRATE 25 MCG: 50 INJECTION, SOLUTION INTRAMUSCULAR; INTRAVENOUS at 01:07

## 2025-07-03 RX ADMIN — ROCURONIUM BROMIDE 50 MG: 10 INJECTION, SOLUTION INTRAVENOUS at 11:07

## 2025-07-03 RX ADMIN — SODIUM CHLORIDE, POTASSIUM CHLORIDE, SODIUM LACTATE AND CALCIUM CHLORIDE: 600; 310; 30; 20 INJECTION, SOLUTION INTRAVENOUS at 11:07

## 2025-07-03 RX ADMIN — ACETAMINOPHEN 1000 MG: 500 TABLET, FILM COATED ORAL at 10:07

## 2025-07-03 RX ADMIN — ROCURONIUM BROMIDE 30 MG: 10 INJECTION, SOLUTION INTRAVENOUS at 12:07

## 2025-07-03 RX ADMIN — FENTANYL CITRATE 100 MCG: 50 INJECTION, SOLUTION INTRAMUSCULAR; INTRAVENOUS at 11:07

## 2025-07-03 RX ADMIN — HYDROMORPHONE HYDROCHLORIDE 0.5 MG: 2 INJECTION INTRAMUSCULAR; INTRAVENOUS; SUBCUTANEOUS at 02:07

## 2025-07-03 RX ADMIN — HYDROMORPHONE HYDROCHLORIDE 0.5 MG: 2 INJECTION INTRAMUSCULAR; INTRAVENOUS; SUBCUTANEOUS at 01:07

## 2025-07-03 RX ADMIN — LIDOCAINE HYDROCHLORIDE 100 MG: 20 INJECTION, SOLUTION EPIDURAL; INFILTRATION; INTRACAUDAL; PERINEURAL at 11:07

## 2025-07-03 RX ADMIN — MIDAZOLAM HYDROCHLORIDE 2 MG: 1 INJECTION, SOLUTION INTRAMUSCULAR; INTRAVENOUS at 02:07

## 2025-07-03 RX ADMIN — SUGAMMADEX 200 MG: 100 INJECTION, SOLUTION INTRAVENOUS at 01:07

## 2025-07-03 RX ADMIN — PROPOFOL 200 MG: 10 INJECTION, EMULSION INTRAVENOUS at 11:07

## 2025-07-03 RX ADMIN — HYDROCODONE BITARTRATE AND ACETAMINOPHEN 1 TABLET: 5; 325 TABLET ORAL at 03:07

## 2025-07-03 RX ADMIN — HYDRALAZINE HYDROCHLORIDE 10 MG: 20 INJECTION INTRAMUSCULAR; INTRAVENOUS at 02:07

## 2025-07-03 RX ADMIN — ONDANSETRON 4 MG: 2 INJECTION INTRAMUSCULAR; INTRAVENOUS at 11:07

## 2025-07-03 RX ADMIN — SODIUM CHLORIDE, POTASSIUM CHLORIDE, SODIUM LACTATE AND CALCIUM CHLORIDE: 600; 310; 30; 20 INJECTION, SOLUTION INTRAVENOUS at 10:07

## 2025-07-03 RX ADMIN — FENTANYL CITRATE 50 MCG: 50 INJECTION, SOLUTION INTRAMUSCULAR; INTRAVENOUS at 12:07

## 2025-07-03 RX ADMIN — EPHEDRINE SULFATE 10 MG: 50 INJECTION INTRAVENOUS at 12:07

## 2025-07-03 RX ADMIN — CLINDAMYCIN PHOSPHATE 900 MG: 900 INJECTION, SOLUTION INTRAVENOUS at 11:07

## 2025-07-03 RX ADMIN — MIDAZOLAM 2 MG: 1 INJECTION INTRAMUSCULAR; INTRAVENOUS at 11:07

## 2025-07-03 NOTE — ANESTHESIA POSTPROCEDURE EVALUATION
Anesthesia Post Evaluation    Patient: Erlinda Alexander    Procedure(s) Performed: Procedure(s) (LRB):  XI ROBOTIC REPAIR, UMBILICAL HERNIA (Supraumbilical hernia) (N/A)    Final Anesthesia Type: general      Patient location during evaluation: PACU  Patient participation: Yes- Able to Participate  Level of consciousness: awake and alert and oriented  Post-procedure vital signs: reviewed and stable  Pain management: adequate  Airway patency: patent    PONV status at discharge: No PONV  Anesthetic complications: no      Cardiovascular status: stable  Respiratory status: unassisted, spontaneous ventilation and room air  Hydration status: euvolemic  Follow-up not needed.              Vitals Value Taken Time   /91 07/03/25 13:35   Temp 37.2 07/03/25 13:37   Pulse 97 07/03/25 13:36   Resp 17 07/03/25 13:36   SpO2 95 % 07/03/25 13:36   Vitals shown include unfiled device data.      No case tracking events are documented in the log.      Pain/Jacquie Score: Pain Rating Prior to Med Admin: 4 (7/3/2025 10:30 AM)

## 2025-07-03 NOTE — TRANSFER OF CARE
"Anesthesia Transfer of Care Note    Patient: Erlinda Alexander    Procedure(s) Performed: Procedure(s) (LRB):  XI ROBOTIC REPAIR, UMBILICAL HERNIA (Supraumbilical hernia) (N/A)    Patient location: PACU    Anesthesia Type: general    Transport from OR: Transported from OR on room air with adequate spontaneous ventilation    Post pain: adequate analgesia    Post assessment: no apparent anesthetic complications    Post vital signs: stable    Level of consciousness: sedated    Nausea/Vomiting: no nausea/vomiting    Complications: none    Transfer of care protocol was followed      Last vitals: Visit Vitals  /87 (BP Location: Left arm, Patient Position: Sitting)   Pulse 100   Temp 36.7 °C (98.1 °F) (Oral)   Resp 20   Ht 5' 1" (1.549 m)   Wt 79.4 kg (175 lb)   LMP 06/13/2018   SpO2 100%   Breastfeeding No   BMI 33.07 kg/m²     "

## 2025-07-03 NOTE — INTERVAL H&P NOTE
The patient has been examined and the H&P has been reviewed:    I concur with the findings and no changes have occurred since H&P was written.    Surgery risks, benefits and alternative options discussed and understood by patient/family.        Staff present during examination Treva Quarles RN    There are no hospital problems to display for this patient.

## 2025-07-03 NOTE — DISCHARGE SUMMARY
[facility] Per[facility]  Discharge Note  Short Stay    Procedure(s) (LRB):  XI ROBOTIC REPAIR, UMBILICAL HERNIA (Supraumbilical hernia) (N/A)      OUTCOME: Patient tolerated treatment/procedure well without complication and is now ready for discharge.    DISPOSITION: Home or Self Care    FINAL DIAGNOSIS:      * Umbilical hernia without obstruction and without gangrene [K42.9]  XI ROBOTIC REPAIR, UMBILICAL HERNIA (Supraumbilical hernia) (N/A)   Recurrent umbilical hernia and new onset supraumbilical hernia repaired with a 10 x 15 cm preperitoneal mesh  FOLLOWUP: In clinic   1 week    DISCHARGE INSTRUCTIONS:  No discharge procedures on file.      Clinical Reference Documents Added to Patient Instructions         Document    ABDOMINAL HERNIA REPAIR DISCHARGE INSTRUCTIONS (ENGLISH)            TIME SPENT ON DISCHARGE:  5  minutes

## 2025-07-03 NOTE — OP NOTE
[facility] - Periop Services  Surgery Department  Operative Note    SUMMARY     Date of Procedure: 7/3/2025     Procedure: Procedure(s) (LRB):  XI ROBOTIC REPAIR, UMBILICAL HERNIA (Supraumbilical hernia) (N/A)   Recurrent umbilical hernia and new onset supraumbilical hernia repaired with a 10 x 15 cm ProGrip mesh preperitoneal mesh  Surgeons and Role:     * Jamie Blair MD - Primary    Assisting Surgeon: None    Pre-Operative Diagnosis: Umbilical hernia without obstruction and without gangrene [K42.9]    Post-Operative Diagnosis: Post-Op Diagnosis Codes:     * Umbilical hernia without obstruction and without gangrene [K42.9]  XI ROBOTIC REPAIR, UMBILICAL HERNIA (Supraumbilical hernia) (N/A)   Recurrent umbilical hernia and new onset supraumbilical hernia repaired with a 10 x 15 cm ProGrip mesh preperitoneal mesh  Anesthesia: General    Description of the Procedure:  Patient is a 54-year-old  female with a history of hypertension smokes a half pack a day for 30 years had an umbilical hernia present for about 2 months no family history of colon cancer it is status post laparoscopic cholecystectomy in 2010 and went had what appeared to be a Swiss cheese appearing periumbilical hernia with a fat containing supraumbilical hernia noted on ultrasound on 06/10/2025.  Patient had consents for repair of the supraumbilical hernia and periumbilical hernia.      Patient was brought to the OR supine position general anesthetic prepped and draped in a sterile fashion.  Patient had a left upper quadrant incision made with the insertion of the Veress needle insufflation abdomen of 14 mm of mercury with insertion of a 5 mm trocar.  Patient then underwent insertion of 3 lateral 8 mm robotic trocars while the patient was rotated 10° to the right.  The trocars were placed on the left side.  Patient had grasping and mobilization of the preperitoneal space and a pocket was created.  The supraumbilical  hernia was reduced the preperitoneal fat was closed.  With a nonabsorbable 0 Prolene.  On a V lock stitch.  The periumbilical region was also closed with a nonabsorbable 0 Prolene.  On a V lock stitch.  Once the umbilical hernia in the supraumbilical hernia were closed a 10 x 15 ProGrip mesh was placed in the preperitoneal space.  It was sutured in place with an absorbable 2-0 V lock suture.  The peritoneum was reapproximated over the mesh.  The adhesive side of the mesh was placed toward the posterior rectus fascia.  Good hemostasis was achieved.  Patient had the aponeuroses of the 8 mm trocar sites closed with 0 Vicryl on  needle.  SubQ was closed with 4-0 plain gut suture.  Dermabond was used for skin.  Sterile dressings were applied no problems were encountered patient tolerated procedure well.        Complications:  No    Estimated Blood Loss (EBL): 0 mL           Implants:   Implant Name Type Inv. Item Serial No.  Lot No. LRB No. Used Action   MESH LAP PG 10X15 DF FLATSHEET - [id number]  MESH LAP PG 10X15 DF FLATSHEET  COVID[employer]EN [id number] N/A 1 Implanted   MESH PROGRIP LAP [id number] RIGHT - [id number]  MESH PROGRIP LAP [id number] RIGHT  [employer]OVID[employer] [id number] N/A 1 Wasted       Specimens:   Specimen (24h ago, onward)      None                    Condition: Good    Disposition: PACU - hemodynamically stable.

## 2025-07-03 NOTE — ANESTHESIA PROCEDURE NOTES
Intubation    Date/Time: 7/3/2025 11:17 AM    Performed by: Iván Ryan CRNA  Authorized by: Rajeev Durbin DO    Intubation:     Induction:  Intravenous    Mask Ventilation:  Easy mask    Attempts:  1    Attempted By:  CRNA and student    Method of Intubation:  Direct    Blade:  Shree 3    Laryngeal View Grade: Grade I - full view of cords      Difficult Airway Encountered?: No      Complications:  None    Airway Device:  Oral endotracheal tube    Airway Device Size:  7.5    Style/Cuff Inflation:  Cuffed (inflated to minimal occlusive pressure)    Tube secured:  21    Secured at:  The lips    Placement Verified By:  Capnometry    Complicating Factors:  None    Findings Post-Intubation:  BS equal bilateral and atraumatic/condition of teeth unchanged

## 2025-07-04 ENCOUNTER — HOSPITAL ENCOUNTER (EMERGENCY)
Facility: HOSPITAL | Age: 55
Discharge: HOME | End: 2025-07-04
Attending: INTERNAL MEDICINE
Payer: MEDICAID

## 2025-07-04 VITALS
WEIGHT: 169 LBS | TEMPERATURE: 97 F | OXYGEN SATURATION: 99 % | HEART RATE: 68 BPM | RESPIRATION RATE: 14 BRPM | BODY MASS INDEX: 31.91 KG/M2 | HEIGHT: 61 IN | DIASTOLIC BLOOD PRESSURE: 89 MMHG | SYSTOLIC BLOOD PRESSURE: 125 MMHG

## 2025-07-04 DIAGNOSIS — G89.18 POSTOPERATIVE PAIN: Primary | ICD-10-CM

## 2025-07-04 DIAGNOSIS — R10.84 GENERALIZED ABDOMINAL PAIN: ICD-10-CM

## 2025-07-04 PROCEDURE — 99283 EMERGENCY DEPT VISIT LOW MDM: CPT | Mod: 25

## 2025-07-04 PROCEDURE — 25000003 PHARM REV CODE 250: Performed by: INTERNAL MEDICINE

## 2025-07-04 RX ORDER — OXYCODONE AND ACETAMINOPHEN 5; 325 MG/1; MG/1
2 TABLET ORAL ONCE
Refills: 0 | Status: COMPLETED | OUTPATIENT
Start: 2025-07-04 | End: 2025-07-04

## 2025-07-04 RX ORDER — OXYCODONE AND ACETAMINOPHEN 10; 325 MG/1; MG/1
1 TABLET ORAL EVERY 6 HOURS PRN
Qty: 12 TABLET | Refills: 0 | Status: SHIPPED | OUTPATIENT
Start: 2025-07-04

## 2025-07-04 RX ADMIN — OXYCODONE HYDROCHLORIDE AND ACETAMINOPHEN 2 TABLET: 5; 325 TABLET ORAL at 07:07

## 2025-07-04 NOTE — ED PROVIDER NOTES
Encounter Date: 2025       History     Chief Complaint   Patient presents with    Post-op Problem     Reports had abd hernia repair yesterday by Dr. Putnam and is having a lot of abd pains     54-year-old black female who is 1 day postop from umbilical hernia repair presents emergency department complaining of abdominal pain.  She states that she is not getting any relief with the pain meds that she was sent home with.  She also admits that in the past she has tried hydrocodone and does not get relief from this from a previous surgery.  She admits she is taking a stool softener as recommended however she has not had a bowel movement since surgery      Review of patient's allergies indicates:   Allergen Reactions    Latex     Latex, natural rubber Rash    Pcn [penicillins] Rash    Sulfa (sulfonamide antibiotics) Rash     Past Medical History:   Diagnosis Date    Hypertension     Seizures      Past Surgical History:   Procedure Laterality Date     SECTION      x2    CHOLECYSTECTOMY      CYST REMOVAL      from back    STRABISMUS SURGERY Bilateral 2018    Procedure: STRABISMUS SURGERY;  Surgeon: MAC Canchola Jr., MD;  Location: [facility] OR [address] [place] [address];  Service: Ophthalmology;  Laterality: Bilateral;     Family History   Problem Relation Name Age of Onset    Diabetes type II Mother      Hypertension Mother      Hypertension Father       Social History[1]  Review of Systems   Constitutional: Negative.  Negative for activity change, appetite change, chills, diaphoresis, fatigue, fever and unexpected weight change.   HENT: Negative.  Negative for congestion, dental problem, drooling, ear discharge, ear pain, facial swelling, hearing loss, mouth sores, nosebleeds, postnasal drip, rhinorrhea, sinus pressure, sinus pain, sneezing, sore throat, tinnitus, trouble swallowing and voice change.    Eyes: Negative.  Negative for photophobia, pain, discharge, redness, itching and visual disturbance.   Respiratory:  Negative.  Negative for apnea, cough, choking, chest tightness, shortness of breath, wheezing and stridor.    Cardiovascular: Negative.  Negative for chest pain, palpitations and leg swelling.   Gastrointestinal:  Positive for abdominal pain. Negative for abdominal distention, anal bleeding, blood in stool, constipation, diarrhea, nausea, rectal pain and vomiting.   Endocrine: Negative.  Negative for cold intolerance, heat intolerance, polydipsia, polyphagia and polyuria.   Genitourinary: Negative.  Negative for decreased urine volume, difficulty urinating, dyspareunia, dysuria, enuresis, flank pain, frequency, genital sores, hematuria, menstrual problem, pelvic pain, urgency, vaginal bleeding, vaginal discharge and vaginal pain.   Musculoskeletal: Negative.  Negative for arthralgias, back pain, gait problem, joint swelling, myalgias, neck pain and neck stiffness.   Skin: Negative.  Negative for color change, pallor, rash and wound.   Allergic/Immunologic: Negative.  Negative for environmental allergies, food allergies and immunocompromised state.   Neurological: Negative.  Negative for dizziness, tremors, seizures, syncope, facial asymmetry, speech difficulty, weakness, light-headedness, numbness and headaches.   Hematological: Negative.  Negative for adenopathy. Does not bruise/bleed easily.   Psychiatric/Behavioral: Negative.  Negative for agitation, behavioral problems, confusion, decreased concentration, dysphoric mood, hallucinations, self-injury, sleep disturbance and suicidal ideas. The patient is not nervous/anxious and is not hyperactive.    All other systems reviewed and are negative.      Physical Exam     Initial Vitals [07/04/25 0711]   BP Pulse Resp Temp SpO2   (!) 154/113 98 18 97.2 °F (36.2 °C) 100 %      MAP       --         Physical Exam    Nursing note and vitals reviewed.  Constitutional: She appears well-developed and well-nourished. She is not diaphoretic. No distress.   HENT:   Head:  Normocephalic and atraumatic. Mouth/Throat: Oropharynx is clear and moist. No oropharyngeal exudate.   Eyes: Conjunctivae and EOM are normal. Pupils are equal, round, and reactive to light. No scleral icterus.   Neck: Neck supple. No JVD present.   Normal range of motion.  Cardiovascular:  Normal rate, regular rhythm, normal heart sounds and intact distal pulses.     Exam reveals no gallop and no friction rub.       No murmur heard.  Pulmonary/Chest: Breath sounds normal. No respiratory distress. She has no wheezes. She exhibits no tenderness.   Abdominal: Abdomen is soft. Bowel sounds are normal. She exhibits no distension. There is generalized abdominal tenderness.   Minimal generalized tenderness of the abdomen with no rebound There is no rebound, no guarding, no tenderness at McBurney's point and negative Ambrose's sign.   Musculoskeletal:         General: Normal range of motion.      Cervical back: Normal range of motion and neck supple.     Neurological: She is alert and oriented to person, place, and time. She has normal strength and normal reflexes.   Skin: Skin is warm and dry. Capillary refill takes less than 2 seconds.   Psychiatric: She has a normal mood and affect. Her behavior is normal. Judgment and thought content normal.         ED Course   Procedures  Labs Reviewed - No data to display       Imaging Results              X-Ray Abdomen Flat And Erect (Final result)  Result time 07/04/25 07:36:33      Final result by Brian Bcek MD (07/04/25 07:36:33)                   Impression:      No acute process seen.      Electronically signed by: Yumiko Beck MD  Date:    07/04/2025  Time:    07:36               Narrative:    EXAMINATION:  XR ABDOMEN FLAT AND ERECT    CLINICAL HISTORY:  Generalized abdominal pain    TECHNIQUE:  Flat and erect AP views of the abdomen were performed.    COMPARISON:  None    FINDINGS:  Bowel gas pattern normal.  Vascular calcifications noted.  Bones intact.             "                           Medications   oxyCODONE-acetaminophen 5-325 mg per tablet 2 tablet (2 tablets Oral Given 7/4/25 0719)     Medical Decision Making  53-year-old black female with generalized abdominal pain after umbilical hernia repair.  Differential diagnosis includes but is not limited to constipation, bowel perforation, postop ileus, postoperative pain, bowel obstruction.  Exam is completely benign and discussing with her she does not respond well to hydrocodone so I have ordered some Percocet and will re-evaluate.  I have ordered a abdominal x-ray upright and flat as well        X-ray of the abdomen is normal, she is markedly improved after Percocet I have called and spoke with Dr. Krueger who agrees with discharge and get 2 days of pain meds and following up in clinic    Problems Addressed:  Generalized abdominal pain: self-limited or minor problem  Postoperative pain: acute illness or injury    Amount and/or Complexity of Data Reviewed  External Data Reviewed: labs, radiology and notes.  Radiology: ordered. Decision-making details documented in ED Course.    Risk  OTC drugs.  Prescription drug management.                                      Clinical Impression:  Final diagnoses:  [R10.84] Generalized abdominal pain  [G89.18] Postoperative pain (Primary)          ED Disposition Condition    Discharge Stable          ED Prescriptions       Medication Sig Dispense Start Date End Date Auth. Provider    oxyCODONE-acetaminophen (PERCOCET)  mg per tablet Take 1 tablet by mouth every 6 (six) hours as needed for Pain. 12 tablet 7/4/2025 -- Levi Jones MD          Follow-up Information       Follow up With Specialties Details Why Contact Info    Jamie Blair MD General Surgery, Cardiology On 7/7/2025  [address] [facility]  [address]  [place] [address]  [phone]              Portions of this note have been created with voice recognition software. Occasional "wrong-words" or "sound alike" " substitutions may have occurred due to inherent limitations of voice software. Please read the note carefully and recognize, using context, word substitutions may have occurred.         [1]   Social History  Tobacco Use    Smoking status: Every Day     Current packs/day: 0.50     Types: Cigarettes    Smokeless tobacco: Never   Substance Use Topics    Alcohol use: No    Drug use: No        Levi Jones MD  07/04/25 0829

## 2025-07-13 ENCOUNTER — HOSPITAL ENCOUNTER (EMERGENCY)
Facility: HOSPITAL | Age: 55
Discharge: HOME | End: 2025-07-13
Attending: INTERNAL MEDICINE
Payer: MEDICAID

## 2025-07-13 VITALS
WEIGHT: 182 LBS | DIASTOLIC BLOOD PRESSURE: 97 MMHG | HEIGHT: 61 IN | TEMPERATURE: 98 F | BODY MASS INDEX: 34.36 KG/M2 | SYSTOLIC BLOOD PRESSURE: 145 MMHG | OXYGEN SATURATION: 99 % | HEART RATE: 87 BPM | RESPIRATION RATE: 20 BRPM

## 2025-07-13 DIAGNOSIS — R10.33 PERIUMBILICAL ABDOMINAL PAIN: Primary | ICD-10-CM

## 2025-07-13 LAB
ALBUMIN SERPL-MCNC: 3.3 G/DL (ref 3.5–5)
ALBUMIN/GLOB SERPL: 0.8 RATIO (ref 1.1–2)
ALP SERPL-CCNC: 85 UNIT/L (ref 40–150)
ALT SERPL-CCNC: 19 UNIT/L (ref 0–55)
ANION GAP SERPL CALC-SCNC: 10 MEQ/L
AST SERPL-CCNC: 22 UNIT/L (ref 11–45)
BASOPHILS # BLD AUTO: 0.08 X10(3)/MCL
BASOPHILS NFR BLD AUTO: 1 %
BILIRUB SERPL-MCNC: 0.4 MG/DL
BILIRUB UR QL STRIP.AUTO: NEGATIVE
BUN SERPL-MCNC: 10 MG/DL (ref 9.8–20.1)
CALCIUM SERPL-MCNC: 9.5 MG/DL (ref 8.4–10.2)
CHLORIDE SERPL-SCNC: 109 MMOL/L (ref 98–107)
CLARITY UR: CLEAR
CO2 SERPL-SCNC: 25 MMOL/L (ref 22–29)
COLOR UR AUTO: YELLOW
CREAT SERPL-MCNC: 0.7 MG/DL (ref 0.55–1.02)
CREAT/UREA NIT SERPL: 14
EOSINOPHIL # BLD AUTO: 0.61 X10(3)/MCL (ref 0–0.9)
EOSINOPHIL NFR BLD AUTO: 7.8 %
ERYTHROCYTE [DISTWIDTH] IN BLOOD BY AUTOMATED COUNT: 15 % (ref 11.5–17)
GFR SERPLBLD CREATININE-BSD FMLA CKD-EPI: >60 ML/MIN/1.73/M2
GLOBULIN SER-MCNC: 4.3 GM/DL (ref 2.4–3.5)
GLUCOSE SERPL-MCNC: 90 MG/DL (ref 74–100)
GLUCOSE UR QL STRIP: NEGATIVE
HCT VFR BLD AUTO: 48 % (ref 37–47)
HGB BLD-MCNC: 15.2 G/DL (ref 12–16)
HGB UR QL STRIP: NEGATIVE
IMM GRANULOCYTES # BLD AUTO: 0.03 X10(3)/MCL (ref 0–0.04)
IMM GRANULOCYTES NFR BLD AUTO: 0.4 %
KETONES UR QL STRIP: ABNORMAL
LEUKOCYTE ESTERASE UR QL STRIP: NEGATIVE
LYMPHOCYTES # BLD AUTO: 2.64 X10(3)/MCL (ref 0.6–4.6)
LYMPHOCYTES NFR BLD AUTO: 33.7 %
MCH RBC QN AUTO: 27.1 PG (ref 27–31)
MCHC RBC AUTO-ENTMCNC: 31.7 G/DL (ref 33–36)
MCV RBC AUTO: 85.7 FL (ref 80–94)
MONOCYTES # BLD AUTO: 0.45 X10(3)/MCL (ref 0.1–1.3)
MONOCYTES NFR BLD AUTO: 5.7 %
NEUTROPHILS # BLD AUTO: 4.03 X10(3)/MCL (ref 2.1–9.2)
NEUTROPHILS NFR BLD AUTO: 51.4 %
NITRITE UR QL STRIP: NEGATIVE
NRBC BLD AUTO-RTO: 0 %
PH UR STRIP: 6.5 [PH]
PLATELET # BLD AUTO: 240 X10(3)/MCL (ref 130–400)
PMV BLD AUTO: 11.6 FL (ref 7.4–10.4)
POTASSIUM SERPL-SCNC: 4 MMOL/L (ref 3.5–5.1)
PROT SERPL-MCNC: 7.6 GM/DL (ref 6.4–8.3)
PROT UR QL STRIP: NEGATIVE
RBC # BLD AUTO: 5.6 X10(6)/MCL (ref 4.2–5.4)
SODIUM SERPL-SCNC: 144 MMOL/L (ref 136–145)
SP GR UR STRIP.AUTO: 1.01 (ref 1–1.03)
UROBILINOGEN UR STRIP-ACNC: 2
WBC # BLD AUTO: 7.84 X10(3)/MCL (ref 4.5–11.5)

## 2025-07-13 PROCEDURE — 96374 THER/PROPH/DIAG INJ IV PUSH: CPT

## 2025-07-13 PROCEDURE — 99284 EMERGENCY DEPT VISIT MOD MDM: CPT | Mod: 25

## 2025-07-13 PROCEDURE — 81003 URINALYSIS AUTO W/O SCOPE: CPT | Performed by: NURSE PRACTITIONER

## 2025-07-13 PROCEDURE — 96375 TX/PRO/DX INJ NEW DRUG ADDON: CPT

## 2025-07-13 PROCEDURE — 63600175 PHARM REV CODE 636 W HCPCS: Performed by: NURSE PRACTITIONER

## 2025-07-13 PROCEDURE — 85025 COMPLETE CBC W/AUTO DIFF WBC: CPT | Performed by: NURSE PRACTITIONER

## 2025-07-13 PROCEDURE — 80053 COMPREHEN METABOLIC PANEL: CPT | Performed by: NURSE PRACTITIONER

## 2025-07-13 RX ORDER — HYDROCODONE BITARTRATE AND ACETAMINOPHEN 7.5; 325 MG/1; MG/1
1 TABLET ORAL EVERY 6 HOURS PRN
Qty: 8 TABLET | Refills: 0 | Status: SHIPPED | OUTPATIENT
Start: 2025-07-13 | End: 2025-07-15

## 2025-07-13 RX ORDER — MORPHINE SULFATE 4 MG/ML
4 INJECTION, SOLUTION INTRAMUSCULAR; INTRAVENOUS
Refills: 0 | Status: COMPLETED | OUTPATIENT
Start: 2025-07-13 | End: 2025-07-13

## 2025-07-13 RX ORDER — ONDANSETRON HYDROCHLORIDE 2 MG/ML
4 INJECTION, SOLUTION INTRAVENOUS
Status: COMPLETED | OUTPATIENT
Start: 2025-07-13 | End: 2025-07-13

## 2025-07-13 RX ADMIN — ONDANSETRON 4 MG: 2 INJECTION INTRAMUSCULAR; INTRAVENOUS at 05:07

## 2025-07-13 RX ADMIN — MORPHINE SULFATE 4 MG: 4 INJECTION, SOLUTION INTRAMUSCULAR; INTRAVENOUS at 05:07

## 2025-07-13 NOTE — ED PROVIDER NOTES
"Encounter Date: 2025       History     Chief Complaint   Patient presents with    Abdominal Pain     Mid abd pain after turning too quickly in bed. Pt states she feel like she "pulled something" in her abd. Pt has abd surgery 7/3, has post appt with Dr. Putnam in AM     See MDM    The history is provided by the patient. No  was used.     Review of patient's allergies indicates:   Allergen Reactions    Latex     Latex, natural rubber Rash    Pcn [penicillins] Rash    Sulfa (sulfonamide antibiotics) Rash     Past Medical History:   Diagnosis Date    Hypertension     Seizures      Past Surgical History:   Procedure Laterality Date     SECTION      x2    CHOLECYSTECTOMY      CYST REMOVAL      from back    STRABISMUS SURGERY Bilateral 2018    Procedure: STRABISMUS SURGERY;  Surgeon: MAC Canchola Jr., MD;  Location: [address];  Service: Ophthalmology;  Laterality: Bilateral;    XI ROBOTIC REPAIR, UMBILICAL HERNIA N/A 7/3/2025    Procedure: XI ROBOTIC REPAIR, UMBILICAL HERNIA (Supraumbilical hernia);  Surgeon: Jamie Blair MD;  Location: [facility];  Service: General;  Laterality: N/A;  SUPRAUMBILICAL AND UMBILICAL HERNIA REPAIR     Family History   Problem Relation Name Age of Onset    Diabetes type II Mother      Hypertension Mother      Hypertension Father       Social History[1]  Review of Systems   Constitutional:  Negative for fever.   Respiratory:  Negative for cough and shortness of breath.    Cardiovascular:  Negative for chest pain.   Gastrointestinal:  Positive for abdominal pain.   Genitourinary:  Negative for difficulty urinating and dysuria.   Musculoskeletal:  Negative for gait problem.   Skin:  Negative for color change.   Neurological:  Negative for dizziness, speech difficulty and headaches.   Psychiatric/Behavioral:  Negative for hallucinations and suicidal ideas.    All other systems reviewed and are negative.      Physical Exam     Initial Vitals " [07/13/25 1630]   BP Pulse Resp Temp SpO2   (!) 155/98 90 20 97.1 °F (36.2 °C) 99 %      MAP       --         Physical Exam    Nursing note and vitals reviewed.  Constitutional: She appears well-developed and well-nourished.   HENT:   Head: Normocephalic.   Eyes: EOM are normal.   Neck:   Normal range of motion.  Cardiovascular:  Normal rate, regular rhythm, normal heart sounds and intact distal pulses.           Pulmonary/Chest: Breath sounds normal. No respiratory distress.   Abdominal: Abdomen is soft. Bowel sounds are normal. There is no abdominal tenderness.   Musculoskeletal:         General: Normal range of motion.      Cervical back: Normal range of motion.     Neurological: She is alert and oriented to person, place, and time. She has normal strength.   Skin: Skin is warm and dry.   Psychiatric: She has a normal mood and affect. Her behavior is normal. Judgment and thought content normal.         ED Course   Procedures  Labs Reviewed   COMPREHENSIVE METABOLIC PANEL - Abnormal       Result Value    Sodium 144      Potassium 4.0      Chloride 109 (*)     CO2 25      Glucose 90      Blood Urea Nitrogen 10.0      Creatinine 0.70      Calcium 9.5      Protein Total 7.6      Albumin 3.3 (*)     Globulin 4.3 (*)     Albumin/Globulin Ratio 0.8 (*)     Bilirubin Total 0.4      ALP 85      ALT 19      AST 22      eGFR >60      Anion Gap 10.0      BUN/Creatinine Ratio 14     URINALYSIS, REFLEX TO URINE CULTURE - Abnormal    Color, UA Yellow      Appearance, UA Clear      Specific Gravity, UA 1.015      pH, UA 6.5      Protein, UA Negative      Glucose, UA Negative      Ketones, UA Trace (*)     Blood, UA Negative      Bilirubin, UA Negative      Urobilinogen, UA 2.0 (*)     Nitrites, UA Negative      Leukocyte Esterase, UA Negative     CBC WITH DIFFERENTIAL - Abnormal    WBC 7.84      RBC 5.60 (*)     Hgb 15.2      Hct 48.0 (*)     MCV 85.7      MCH 27.1      MCHC 31.7 (*)     RDW 15.0      Platelet 240      MPV 11.6  (*)     Neut % 51.4      Lymph % 33.7      Mono % 5.7      Eos % 7.8      Basophil % 1.0      Imm Grans % 0.4      Neut # 4.03      Lymph # 2.64      Mono # 0.45      Eos # 0.61      Baso # 0.08      Imm Gran # 0.03      NRBC% 0.0     CBC W/ AUTO DIFFERENTIAL    Narrative:     The following orders were created for panel order CBC auto differential.  Procedure                               Abnormality         Status                     ---------                               -----------         ------                     CBC with Differential[[id number]]       Abnormal            Final result                 Please view results for these tests on the individual orders.          Imaging Results    None          Medications   morphine injection 4 mg (4 mg Intravenous Given 7/13/25 1709)   ondansetron injection 4 mg (4 mg Intravenous Given 7/13/25 1708)     Medical Decision Making  Historian:  Patient.  Patient is a Black or  54 y.o. female that presents with mid-abdominal pain that has been present today. Associated symptoms nothing. Surrounding information is nothing. Exacerbated by nothing. Relieved by nothing. Patient treatment prior to arrival none. Risk factors include none. Other history pertaining to this complaint recent hernia repair.   Assessment:  See physical exam.  DD:  Postop pain  ED Course: History was obtained.  Physical was performed.  As discussed this case with the general surgeon that did her hernia repair and he will be seeing her tomorrow.  He does not recommend a CT scan at this time. Medical or surgical consults:  General surgery. Social determinants that affect healthcare:  None.       Amount and/or Complexity of Data Reviewed  Labs: ordered.     Details: Labs unremarkable   Discussion of management or test interpretation with external provider(s): discussed this case with Dr. ZARI Blair, general surgery, he will be seeing her tomorrow.     Risk  Prescription drug  management.  Risk Details: norco              Attending Attestation:             Attending ED Notes:         I'm Dr. Kendra Hartley   I did not spend face to face time with this patient.  I agree with the evaluation and management decisions made in this patient's care and agree with the study interpretations.    The patient was seen by the nurse practitioner practicing independently here in the emergency department.  While I was available in the emergency department for consultation, I did not personally evaluate, examine, participate in the care of, or make recommendation(s) on the of the management of said patient.  My signature is an administrative requirement of the facility and should not be construed as my active or tacit approval of the evaluation or care provided. I, therefore, am unable to determine appropriateness of management without obtaining a personal history and exam.                                        Clinical Impression:  Final diagnoses:  [R10.33] Periumbilical abdominal pain (Primary)          ED Disposition Condition    Discharge Stable          ED Prescriptions       Medication Sig Dispense Start Date End Date Auth. Provider    HYDROcodone-acetaminophen (NORCO) 7.5-325 mg per tablet Take 1 tablet by mouth every 6 (six) hours as needed for Pain. 8 tablet 7/13/2025 7/15/2025 Isma Mckinnon FNP          Follow-up Information       Follow up With Specialties Details Why Contact Info    Your Primary Care Provider  Call in 3 days ed follow up                  Isma Mckinnon FNP  07/13/25 2914         [1]   Social History  Tobacco Use    Smoking status: Every Day     Current packs/day: 0.50     Types: Cigarettes    Smokeless tobacco: Never   Vaping Use    Vaping status: Never Used   Substance Use Topics    Alcohol use: No    Drug use: No        Kendra Hartley MD  07/14/25 0606

## 2025-07-26 ENCOUNTER — HOSPITAL ENCOUNTER (EMERGENCY)
Facility: HOSPITAL | Age: 55
Discharge: HOME | End: 2025-07-26
Attending: EMERGENCY MEDICINE
Payer: MEDICAID

## 2025-07-26 VITALS
SYSTOLIC BLOOD PRESSURE: 148 MMHG | BODY MASS INDEX: 34.93 KG/M2 | WEIGHT: 185 LBS | RESPIRATION RATE: 16 BRPM | HEIGHT: 61 IN | OXYGEN SATURATION: 98 % | HEART RATE: 89 BPM | TEMPERATURE: 98 F | DIASTOLIC BLOOD PRESSURE: 97 MMHG

## 2025-07-26 DIAGNOSIS — K43.9 VENTRAL HERNIA WITHOUT OBSTRUCTION OR GANGRENE: ICD-10-CM

## 2025-07-26 DIAGNOSIS — R10.12 LEFT UPPER QUADRANT ABDOMINAL PAIN: Primary | ICD-10-CM

## 2025-07-26 LAB
ALBUMIN SERPL-MCNC: 3.6 G/DL (ref 3.5–5)
ALBUMIN/GLOB SERPL: 0.8 RATIO (ref 1.1–2)
ALP SERPL-CCNC: 87 UNIT/L (ref 40–150)
ALT SERPL-CCNC: 21 UNIT/L (ref 0–55)
ANION GAP SERPL CALC-SCNC: 10 MEQ/L
AST SERPL-CCNC: 28 UNIT/L (ref 11–45)
BASOPHILS # BLD AUTO: 0.05 X10(3)/MCL
BASOPHILS NFR BLD AUTO: 0.7 %
BILIRUB SERPL-MCNC: 0.8 MG/DL
BILIRUB UR QL STRIP.AUTO: NEGATIVE
BUN SERPL-MCNC: 11 MG/DL (ref 9.8–20.1)
CALCIUM SERPL-MCNC: 9.5 MG/DL (ref 8.4–10.2)
CHLORIDE SERPL-SCNC: 110 MMOL/L (ref 98–107)
CLARITY UR: CLEAR
CO2 SERPL-SCNC: 21 MMOL/L (ref 22–29)
COLOR UR AUTO: YELLOW
CREAT SERPL-MCNC: 0.76 MG/DL (ref 0.55–1.02)
CREAT/UREA NIT SERPL: 14
EOSINOPHIL # BLD AUTO: 0.67 X10(3)/MCL (ref 0–0.9)
EOSINOPHIL NFR BLD AUTO: 9 %
ERYTHROCYTE [DISTWIDTH] IN BLOOD BY AUTOMATED COUNT: 15.3 % (ref 11.5–17)
GFR SERPLBLD CREATININE-BSD FMLA CKD-EPI: >60 ML/MIN/1.73/M2
GLOBULIN SER-MCNC: 4.4 GM/DL (ref 2.4–3.5)
GLUCOSE SERPL-MCNC: 92 MG/DL (ref 74–100)
GLUCOSE UR QL STRIP: NEGATIVE
HCT VFR BLD AUTO: 48.4 % (ref 37–47)
HGB BLD-MCNC: 15.2 G/DL (ref 12–16)
HGB UR QL STRIP: NEGATIVE
IMM GRANULOCYTES # BLD AUTO: 0.02 X10(3)/MCL (ref 0–0.04)
IMM GRANULOCYTES NFR BLD AUTO: 0.3 %
KETONES UR QL STRIP: NEGATIVE
LEUKOCYTE ESTERASE UR QL STRIP: NEGATIVE
LIPASE SERPL-CCNC: 26 U/L
LYMPHOCYTES # BLD AUTO: 3.06 X10(3)/MCL (ref 0.6–4.6)
LYMPHOCYTES NFR BLD AUTO: 40.9 %
MCH RBC QN AUTO: 27.1 PG (ref 27–31)
MCHC RBC AUTO-ENTMCNC: 31.4 G/DL (ref 33–36)
MCV RBC AUTO: 86.4 FL (ref 80–94)
MONOCYTES # BLD AUTO: 0.42 X10(3)/MCL (ref 0.1–1.3)
MONOCYTES NFR BLD AUTO: 5.6 %
NEUTROPHILS # BLD AUTO: 3.26 X10(3)/MCL (ref 2.1–9.2)
NEUTROPHILS NFR BLD AUTO: 43.5 %
NITRITE UR QL STRIP: NEGATIVE
NRBC BLD AUTO-RTO: 0 %
PH UR STRIP: 7 [PH]
PLATELET # BLD AUTO: 249 X10(3)/MCL (ref 130–400)
PMV BLD AUTO: 12 FL (ref 7.4–10.4)
POTASSIUM SERPL-SCNC: 4.7 MMOL/L (ref 3.5–5.1)
PROT SERPL-MCNC: 8 GM/DL (ref 6.4–8.3)
PROT UR QL STRIP: NEGATIVE
RBC # BLD AUTO: 5.6 X10(6)/MCL (ref 4.2–5.4)
SODIUM SERPL-SCNC: 141 MMOL/L (ref 136–145)
SP GR UR STRIP.AUTO: 1.01 (ref 1–1.03)
TROPONIN I SERPL HS-MCNC: 3 NG/L
UROBILINOGEN UR STRIP-ACNC: 2
WBC # BLD AUTO: 7.48 X10(3)/MCL (ref 4.5–11.5)

## 2025-07-26 PROCEDURE — 63600175 PHARM REV CODE 636 W HCPCS: Performed by: NURSE PRACTITIONER

## 2025-07-26 PROCEDURE — 96375 TX/PRO/DX INJ NEW DRUG ADDON: CPT

## 2025-07-26 PROCEDURE — 85025 COMPLETE CBC W/AUTO DIFF WBC: CPT | Performed by: EMERGENCY MEDICINE

## 2025-07-26 PROCEDURE — 96374 THER/PROPH/DIAG INJ IV PUSH: CPT

## 2025-07-26 PROCEDURE — 99285 EMERGENCY DEPT VISIT HI MDM: CPT | Mod: 25

## 2025-07-26 PROCEDURE — 83690 ASSAY OF LIPASE: CPT | Performed by: EMERGENCY MEDICINE

## 2025-07-26 PROCEDURE — 84484 ASSAY OF TROPONIN QUANT: CPT | Performed by: NURSE PRACTITIONER

## 2025-07-26 PROCEDURE — 80053 COMPREHEN METABOLIC PANEL: CPT | Performed by: EMERGENCY MEDICINE

## 2025-07-26 PROCEDURE — 25500020 PHARM REV CODE 255: Performed by: EMERGENCY MEDICINE

## 2025-07-26 PROCEDURE — 81003 URINALYSIS AUTO W/O SCOPE: CPT | Performed by: NURSE PRACTITIONER

## 2025-07-26 RX ORDER — HYDROCODONE BITARTRATE AND ACETAMINOPHEN 5; 325 MG/1; MG/1
1 TABLET ORAL EVERY 6 HOURS PRN
Qty: 12 TABLET | Refills: 0 | Status: SHIPPED | OUTPATIENT
Start: 2025-07-26

## 2025-07-26 RX ORDER — MORPHINE SULFATE 4 MG/ML
4 INJECTION, SOLUTION INTRAMUSCULAR; INTRAVENOUS
Refills: 0 | Status: COMPLETED | OUTPATIENT
Start: 2025-07-26 | End: 2025-07-26

## 2025-07-26 RX ORDER — ONDANSETRON HYDROCHLORIDE 2 MG/ML
8 INJECTION, SOLUTION INTRAVENOUS
Status: COMPLETED | OUTPATIENT
Start: 2025-07-26 | End: 2025-07-26

## 2025-07-26 RX ORDER — DOXYCYCLINE 100 MG/1
100 CAPSULE ORAL 2 TIMES DAILY
Qty: 20 CAPSULE | Refills: 0 | Status: SHIPPED | OUTPATIENT
Start: 2025-07-26 | End: 2025-08-05

## 2025-07-26 RX ORDER — IOPAMIDOL 755 MG/ML
100 INJECTION, SOLUTION INTRAVASCULAR
Status: COMPLETED | OUTPATIENT
Start: 2025-07-26 | End: 2025-07-26

## 2025-07-26 RX ADMIN — ONDANSETRON 8 MG: 2 INJECTION INTRAMUSCULAR; INTRAVENOUS at 12:07

## 2025-07-26 RX ADMIN — IOPAMIDOL 100 ML: 755 INJECTION, SOLUTION INTRAVENOUS at 12:07

## 2025-07-26 RX ADMIN — MORPHINE SULFATE 4 MG: 4 INJECTION, SOLUTION INTRAMUSCULAR; INTRAVENOUS at 12:07

## 2025-07-26 NOTE — ED PROVIDER NOTES
Encounter Date: 2025       History     Chief Complaint   Patient presents with    Abdominal Pain     C/o abominal pain that started again 4 days ago, post hernia repair by Dr. Putnam 7/3/25. Pt states that she also noticed pus from one of the incisions sites 2 nights ago.     Year old female presents with abdominal pain that started 4 days ago. Patient reports she had a hernia repair surgery by Dr. Putnam on 7/3/2025.  She states she noticed blood and pus coming from 1 of the incision sites at the top left hand side 2 nights ago.  Patient reports tenderness upon palpation of abdomen to incisions. Wounds healed well with no discharge noted.     The history is provided by the patient.     Review of patient's allergies indicates:   Allergen Reactions    Latex     Latex, natural rubber Rash    Pcn [penicillins] Rash    Sulfa (sulfonamide antibiotics) Rash     Past Medical History:   Diagnosis Date    Hypertension     Seizures      Past Surgical History:   Procedure Laterality Date     SECTION      x2    CHOLECYSTECTOMY      CYST REMOVAL      from back    STRABISMUS SURGERY Bilateral 2018    Procedure: STRABISMUS SURGERY;  Surgeon: MAC Canchola Jr., MD;  Location: [address];  Service: Ophthalmology;  Laterality: Bilateral;    XI ROBOTIC REPAIR, UMBILICAL HERNIA N/A 7/3/2025    Procedure: XI ROBOTIC REPAIR, UMBILICAL HERNIA (Supraumbilical hernia);  Surgeon: Jamie Blair MD;  Location: [facility];  Service: General;  Laterality: N/A;  SUPRAUMBILICAL AND UMBILICAL HERNIA REPAIR     Family History   Problem Relation Name Age of Onset    Diabetes type II Mother      Hypertension Mother      Hypertension Father       Social History[1]  Review of Systems   Constitutional:  Negative for fever.   Respiratory:  Negative for apnea, cough, choking, chest tightness, shortness of breath, wheezing and stridor.    Cardiovascular:  Negative for chest pain, palpitations and leg swelling.    Gastrointestinal:  Positive for abdominal pain. Negative for nausea and vomiting.   Neurological:  Negative for dizziness, tremors, seizures, syncope, facial asymmetry, speech difficulty, weakness, light-headedness, numbness and headaches.   All other systems reviewed and are negative.      Physical Exam     Initial Vitals [07/26/25 1119]   BP Pulse Resp Temp SpO2   (!) 150/98 100 18 98.6 °F (37 °C) 99 %      MAP       --         Physical Exam    Nursing note and vitals reviewed.  Constitutional: She appears well-developed and well-nourished.   HENT:   Head: Normocephalic and atraumatic.   Right Ear: External ear normal.   Left Ear: External ear normal.   Nose: Nose normal. Mouth/Throat: Oropharynx is clear and moist.   Eyes: Conjunctivae and EOM are normal.   Neck: Neck supple.   Normal range of motion.  Cardiovascular:  Normal rate, regular rhythm, normal heart sounds and intact distal pulses.           Pulmonary/Chest: Breath sounds normal.   Abdominal: Abdomen is soft. Bowel sounds are normal. There is abdominal tenderness.   No redness, no swelling, no discharge noted to surgery incision sites.     There is no rebound and no guarding.   Musculoskeletal:         General: Normal range of motion.      Cervical back: Normal range of motion and neck supple.     Neurological: She is alert and oriented to person, place, and time. She has normal strength and normal reflexes. GCS score is 15. GCS eye subscore is 4. GCS verbal subscore is 5. GCS motor subscore is 6.   Skin: Skin is warm and dry. Capillary refill takes less than 2 seconds.   Psychiatric: She has a normal mood and affect. Her behavior is normal. Judgment and thought content normal.         ED Course   Procedures  Labs Reviewed   COMPREHENSIVE METABOLIC PANEL - Abnormal       Result Value    Sodium 141      Potassium 4.7      Chloride 110 (*)     CO2 21 (*)     Glucose 92      Blood Urea Nitrogen 11.0      Creatinine 0.76      Calcium 9.5      Protein  Total 8.0      Albumin 3.6      Globulin 4.4 (*)     Albumin/Globulin Ratio 0.8 (*)     Bilirubin Total 0.8      ALP 87      ALT 21      AST 28      eGFR >60      Anion Gap 10.0      BUN/Creatinine Ratio 14     CBC WITH DIFFERENTIAL - Abnormal    WBC 7.48      RBC 5.60 (*)     Hgb 15.2      Hct 48.4 (*)     MCV 86.4      MCH 27.1      MCHC 31.4 (*)     RDW 15.3      Platelet 249      MPV 12.0 (*)     Neut % 43.5      Lymph % 40.9      Mono % 5.6      Eos % 9.0      Basophil % 0.7      Imm Grans % 0.3      Neut # 3.26      Lymph # 3.06      Mono # 0.42      Eos # 0.67      Baso # 0.05      Imm Gran # 0.02      NRBC% 0.0     URINALYSIS, REFLEX TO URINE CULTURE - Abnormal    Color, UA Yellow      Appearance, UA Clear      Specific Gravity, UA 1.010      pH, UA 7.0      Protein, UA Negative      Glucose, UA Negative      Ketones, UA Negative      Blood, UA Negative      Bilirubin, UA Negative      Urobilinogen, UA 2.0 (*)     Nitrites, UA Negative      Leukocyte Esterase, UA Negative     LIPASE - Normal    Lipase Level 26     TROPONIN I HIGH SENSITIVITY - Normal    Troponin High Sensitive 3     CBC W/ AUTO DIFFERENTIAL    Narrative:     The following orders were created for panel order CBC auto differential.  Procedure                               Abnormality         Status                     ---------                               -----------         ------                     CBC with Differential[[id number]]       Abnormal            Final result                 Please view results for these tests on the individual orders.          Imaging Results              CT Abdomen Pelvis With IV Contrast NO Oral Contrast (Final result)  Result time 07/26/25 13:10:53      Final result by Tian Moreno MD (07/26/25 13:10:53)                   Impression:      Postsurgical changes of the anterior abdominal wall with resolved umbilical hernia with concern for small residual ventral hernia.      Electronically signed by: Tian  MD Josh  Date:    07/26/2025  Time:    13:10               Narrative:    EXAMINATION:  CT ABDOMEN PELVIS WITH IV CONTRAST    CLINICAL HISTORY:  Abdominal abscess/infection suspected;    TECHNIQUE:  Multiple cross-section obtained from lung bases the pubic symphysis after the intravenous administration of 100 mL of Isovue 370.  Normal sagittal reformatted images were obtained.  An automated dose exposure technique was utilized this limits radiation does the patient.    COMPARISON:  None    FINDINGS:  Dependent atelectatic changes lungs.  Heart size within normal limits.    The liver, spleen, adrenals, and pancreas are normal.  Kidneys are symmetric in size Bosniak type 1 cyst.  Gallbladder is surgically absent.    Small bowel is normal caliber.  Colon is also normal caliber with scattered colonic diverticula.  No adjacent inflammatory changes.  Normal appendix.    Bladder is under distended with pelvic floor relaxation.  Uterus is anteverted without evidence for adnexal mass.  Course and caliber of the abdominal is normal.  No free fluid in the pelvis.  No evidence for adenopathy.    No suspicious osseous lesions.  Scattered spondylotic changes are identified.  Postsurgical changes identified of prior resection of ventral wall hernia.  Cranial to the surgical site eccentric to the right concern for subtle residual ventral hernia.  The umbilical hernia has resolved.                                       Medications   morphine injection 4 mg (4 mg Intravenous Given 7/26/25 1223)   ondansetron injection 8 mg (8 mg Intravenous Given 7/26/25 1223)   iopamidoL (ISOVUE-370) injection 100 mL (100 mLs Intravenous Given 7/26/25 1255)     Medical Decision Making  Amount and/or Complexity of Data Reviewed  Labs: ordered.  Radiology: ordered.    Risk  Prescription drug management.                           Medical Decision Making:   Differential Diagnosis:   Cellulitis of surgery incision, Acute abdomen, Constipation                 Clinical Impression:  Final diagnoses:  [R10.12] Left upper quadrant abdominal pain (Primary)  [K43.9] Ventral hernia without obstruction or gangrene          ED Disposition Condition    Discharge Stable          ED Prescriptions       Medication Sig Dispense Start Date End Date Auth. Provider    doxycycline (VIBRAMYCIN) 100 MG Cap Take 1 capsule (100 mg total) by mouth 2 (two) times daily. for 10 days 20 capsule 7/26/2025 8/5/2025 Eldon Fonseca FNP    HYDROcodone-acetaminophen (NORCO) 5-325 mg per tablet Take 1 tablet by mouth every 6 (six) hours as needed for Pain. 12 tablet 7/26/2025 -- Eldon Fonseca FNP          Follow-up Information       Follow up With Specialties Details Why Contact Info    Az Spencer, DO Internal Medicine Schedule an appointment as soon as possible for a visit   [address] [place] [address] [phone]      [facility] - Emergency Dept Emergency Medicine  If symptoms worsen [address] [facility] [address] [phone]    Follow up with Dr. Jersey Garcia on Monday  Schedule an appointment as soon as possible for a visit                    Eldon Fonseca FNP  07/26/25 5748         [1]   Social History  Tobacco Use    Smoking status: Every Day     Current packs/day: 0.50     Types: Cigarettes    Smokeless tobacco: Never   Vaping Use    Vaping status: Never Used   Substance Use Topics    Alcohol use: No    Drug use: No        Eldon Fonseca FNP  07/26/25 [id number]

## 2025-08-01 DIAGNOSIS — Z12.31 OTHER SCREENING MAMMOGRAM: Primary | ICD-10-CM

## 2025-08-22 ENCOUNTER — HOSPITAL ENCOUNTER (OUTPATIENT)
Dept: RADIOLOGY | Facility: HOSPITAL | Age: 55
Discharge: HOME | End: 2025-08-22
Attending: EMERGENCY MEDICINE
Payer: MEDICAID

## 2025-08-22 DIAGNOSIS — Z12.31 OTHER SCREENING MAMMOGRAM: ICD-10-CM

## 2025-08-22 PROCEDURE — 77063 BREAST TOMOSYNTHESIS BI: CPT | Mod: 26,,, | Performed by: STUDENT IN AN ORGANIZED HEALTH CARE EDUCATION/TRAINING PROGRAM

## 2025-08-22 PROCEDURE — 77067 SCR MAMMO BI INCL CAD: CPT | Mod: TC

## 2025-08-22 PROCEDURE — 77067 SCR MAMMO BI INCL CAD: CPT | Mod: 26,,, | Performed by: STUDENT IN AN ORGANIZED HEALTH CARE EDUCATION/TRAINING PROGRAM

## 2025-08-28 ENCOUNTER — HOSPITAL ENCOUNTER (EMERGENCY)
Facility: HOSPITAL | Age: 55
Discharge: HOME | End: 2025-08-28
Attending: EMERGENCY MEDICINE
Payer: MEDICAID

## 2025-08-28 VITALS
BODY MASS INDEX: 33.04 KG/M2 | HEART RATE: 82 BPM | OXYGEN SATURATION: 98 % | TEMPERATURE: 99 F | SYSTOLIC BLOOD PRESSURE: 138 MMHG | WEIGHT: 175 LBS | RESPIRATION RATE: 20 BRPM | HEIGHT: 61 IN | DIASTOLIC BLOOD PRESSURE: 88 MMHG

## 2025-08-28 DIAGNOSIS — R10.9 ABDOMINAL PAIN, UNSPECIFIED ABDOMINAL LOCATION: ICD-10-CM

## 2025-08-28 DIAGNOSIS — Z87.19 H/O UMBILICAL HERNIA REPAIR: Primary | ICD-10-CM

## 2025-08-28 DIAGNOSIS — Z98.890 H/O UMBILICAL HERNIA REPAIR: Primary | ICD-10-CM

## 2025-08-28 LAB
ALBUMIN SERPL-MCNC: 3.8 G/DL (ref 3.5–5)
ALBUMIN/GLOB SERPL: 1.1 RATIO (ref 1.1–2)
ALP SERPL-CCNC: 80 UNIT/L (ref 40–150)
ALT SERPL-CCNC: 21 UNIT/L (ref 0–55)
ANION GAP SERPL CALC-SCNC: 9 MEQ/L
AST SERPL-CCNC: 27 UNIT/L (ref 11–45)
BACTERIA #/AREA URNS AUTO: ABNORMAL /HPF
BASOPHILS # BLD AUTO: 0.02 X10(3)/MCL
BASOPHILS NFR BLD AUTO: 0.2 %
BILIRUB SERPL-MCNC: 1 MG/DL
BILIRUB UR QL STRIP.AUTO: ABNORMAL
BUN SERPL-MCNC: 18 MG/DL (ref 9.8–20.1)
CALCIUM SERPL-MCNC: 9.6 MG/DL (ref 8.4–10.2)
CHLORIDE SERPL-SCNC: 107 MMOL/L (ref 98–107)
CLARITY UR: ABNORMAL
CO2 SERPL-SCNC: 24 MMOL/L (ref 22–29)
COLOR UR AUTO: YELLOW
CREAT SERPL-MCNC: 1.11 MG/DL (ref 0.55–1.02)
CREAT/UREA NIT SERPL: 16
EOSINOPHIL # BLD AUTO: 0.16 X10(3)/MCL (ref 0–0.9)
EOSINOPHIL NFR BLD AUTO: 1.8 %
ERYTHROCYTE [DISTWIDTH] IN BLOOD BY AUTOMATED COUNT: 14.9 % (ref 11.5–17)
GFR SERPLBLD CREATININE-BSD FMLA CKD-EPI: 60 ML/MIN/1.73/M2
GLOBULIN SER-MCNC: 3.5 GM/DL (ref 2.4–3.5)
GLUCOSE SERPL-MCNC: 111 MG/DL (ref 74–100)
GLUCOSE UR QL STRIP: ABNORMAL
HCT VFR BLD AUTO: 42.8 % (ref 37–47)
HGB BLD-MCNC: 13.9 G/DL (ref 12–16)
HGB UR QL STRIP: NEGATIVE
IMM GRANULOCYTES # BLD AUTO: 0.03 X10(3)/MCL (ref 0–0.04)
IMM GRANULOCYTES NFR BLD AUTO: 0.3 %
KETONES UR QL STRIP: ABNORMAL
LEUKOCYTE ESTERASE UR QL STRIP: NEGATIVE
LIPASE SERPL-CCNC: 17 U/L
LYMPHOCYTES # BLD AUTO: 3.04 X10(3)/MCL (ref 0.6–4.6)
LYMPHOCYTES NFR BLD AUTO: 33.5 %
MCH RBC QN AUTO: 27.8 PG (ref 27–31)
MCHC RBC AUTO-ENTMCNC: 32.5 G/DL (ref 33–36)
MCV RBC AUTO: 85.6 FL (ref 80–94)
MONOCYTES # BLD AUTO: 0.55 X10(3)/MCL (ref 0.1–1.3)
MONOCYTES NFR BLD AUTO: 6.1 %
MUCOUS THREADS URNS QL MICRO: ABNORMAL /LPF
NEUTROPHILS # BLD AUTO: 5.27 X10(3)/MCL (ref 2.1–9.2)
NEUTROPHILS NFR BLD AUTO: 58.1 %
NITRITE UR QL STRIP: NEGATIVE
PH UR STRIP: 6 [PH]
PLATELET # BLD AUTO: 201 X10(3)/MCL (ref 130–400)
PMV BLD AUTO: 11.8 FL (ref 7.4–10.4)
POTASSIUM SERPL-SCNC: 3.8 MMOL/L (ref 3.5–5.1)
PROT SERPL-MCNC: 7.3 GM/DL (ref 6.4–8.3)
PROT UR QL STRIP: ABNORMAL
RBC # BLD AUTO: 5 X10(6)/MCL (ref 4.2–5.4)
RBC #/AREA URNS AUTO: ABNORMAL /HPF
SODIUM SERPL-SCNC: 140 MMOL/L (ref 136–145)
SP GR UR STRIP.AUTO: 1.02 (ref 1–1.03)
SQUAMOUS #/AREA URNS AUTO: ABNORMAL /HPF
UROBILINOGEN UR STRIP-ACNC: 2
WBC # BLD AUTO: 9.07 X10(3)/MCL (ref 4.5–11.5)
WBC #/AREA URNS AUTO: ABNORMAL /HPF

## 2025-08-28 PROCEDURE — 83690 ASSAY OF LIPASE: CPT | Performed by: EMERGENCY MEDICINE

## 2025-08-28 PROCEDURE — 80053 COMPREHEN METABOLIC PANEL: CPT | Performed by: EMERGENCY MEDICINE

## 2025-08-28 PROCEDURE — 96374 THER/PROPH/DIAG INJ IV PUSH: CPT

## 2025-08-28 PROCEDURE — 96376 TX/PRO/DX INJ SAME DRUG ADON: CPT

## 2025-08-28 PROCEDURE — 96361 HYDRATE IV INFUSION ADD-ON: CPT

## 2025-08-28 PROCEDURE — 25000003 PHARM REV CODE 250: Performed by: EMERGENCY MEDICINE

## 2025-08-28 PROCEDURE — 63600175 PHARM REV CODE 636 W HCPCS: Performed by: EMERGENCY MEDICINE

## 2025-08-28 PROCEDURE — 96375 TX/PRO/DX INJ NEW DRUG ADDON: CPT

## 2025-08-28 PROCEDURE — 81003 URINALYSIS AUTO W/O SCOPE: CPT | Performed by: EMERGENCY MEDICINE

## 2025-08-28 PROCEDURE — 99285 EMERGENCY DEPT VISIT HI MDM: CPT | Mod: 25

## 2025-08-28 PROCEDURE — 25500020 PHARM REV CODE 255: Performed by: EMERGENCY MEDICINE

## 2025-08-28 PROCEDURE — 85025 COMPLETE CBC W/AUTO DIFF WBC: CPT | Performed by: EMERGENCY MEDICINE

## 2025-08-28 RX ORDER — MORPHINE SULFATE 4 MG/ML
4 INJECTION, SOLUTION INTRAMUSCULAR; INTRAVENOUS
Refills: 0 | Status: COMPLETED | OUTPATIENT
Start: 2025-08-28 | End: 2025-08-28

## 2025-08-28 RX ORDER — DOXYCYCLINE 100 MG/1
100 CAPSULE ORAL 2 TIMES DAILY
Qty: 14 CAPSULE | Refills: 0 | Status: SHIPPED | OUTPATIENT
Start: 2025-08-28 | End: 2025-09-04

## 2025-08-28 RX ORDER — CIPROFLOXACIN 500 MG/1
500 TABLET, FILM COATED ORAL 2 TIMES DAILY
Qty: 14 TABLET | Refills: 0 | Status: SHIPPED | OUTPATIENT
Start: 2025-08-28 | End: 2025-09-04

## 2025-08-28 RX ORDER — IOPAMIDOL 755 MG/ML
100 INJECTION, SOLUTION INTRAVASCULAR
Status: COMPLETED | OUTPATIENT
Start: 2025-08-28 | End: 2025-08-28

## 2025-08-28 RX ORDER — HYDROCODONE BITARTRATE AND ACETAMINOPHEN 5; 325 MG/1; MG/1
1 TABLET ORAL EVERY 6 HOURS PRN
Qty: 12 TABLET | Refills: 0 | Status: SHIPPED | OUTPATIENT
Start: 2025-08-28

## 2025-08-28 RX ORDER — SODIUM CHLORIDE 9 MG/ML
1000 INJECTION, SOLUTION INTRAVENOUS
Status: COMPLETED | OUTPATIENT
Start: 2025-08-28 | End: 2025-08-28

## 2025-08-28 RX ORDER — ONDANSETRON HYDROCHLORIDE 2 MG/ML
4 INJECTION, SOLUTION INTRAVENOUS
Status: COMPLETED | OUTPATIENT
Start: 2025-08-28 | End: 2025-08-28

## 2025-08-28 RX ADMIN — IOPAMIDOL 100 ML: 755 INJECTION, SOLUTION INTRAVENOUS at 03:08

## 2025-08-28 RX ADMIN — MORPHINE SULFATE 4 MG: 4 INJECTION, SOLUTION INTRAMUSCULAR; INTRAVENOUS at 03:08

## 2025-08-28 RX ADMIN — SODIUM CHLORIDE 1000 ML: 9 INJECTION, SOLUTION INTRAVENOUS at 03:08

## 2025-08-28 RX ADMIN — ONDANSETRON 4 MG: 2 INJECTION INTRAMUSCULAR; INTRAVENOUS at 03:08

## (undated) DEVICE — GLOVE SENSICARE NEOPRENE 6.5

## (undated) DEVICE — GLOVE SENSICARE PI SURG 6.5

## (undated) DEVICE — TUBING HF INSUFFLATION W/ FLTR

## (undated) DEVICE — GRASPER FEN TIP UP XI

## (undated) DEVICE — NDL ECLIPSE HYPO 22GA 1IN

## (undated) DEVICE — COVER TIP CURVED SCISSORS XI

## (undated) DEVICE — SHEET EENT SPLIT

## (undated) DEVICE — FORCEP CURVED DISP

## (undated) DEVICE — CATH INTROCAN SAF 2 IV 22GX1IN

## (undated) DEVICE — GLOVE SIGNATURE ESSNTL LTX 6.5

## (undated) DEVICE — SUT 6/0 18IN COATED VICRYL

## (undated) DEVICE — SOL CLEARIFY VISUALIZATION LAP

## (undated) DEVICE — SUT PLN GUT 2-0 CT-3 1X27

## (undated) DEVICE — DRESSING TRANS 2X2 TEGADERM

## (undated) DEVICE — PROTECTOR ONE-STEP ARM REG

## (undated) DEVICE — ADHESIVE DERMABOND ADVANCED

## (undated) DEVICE — SCISSOR HOT SHEARS XI

## (undated) DEVICE — SEE MEDLINE ITEM 157128

## (undated) DEVICE — OBTURATOR BLADELESS 8MM XI CLR

## (undated) DEVICE — NDL MAGELLAN SAFETY 18G 1.5IN

## (undated) DEVICE — TOWEL OR DISP STRL BLUE 4/PK

## (undated) DEVICE — GOWN POLY REINF BRTH SLV XL

## (undated) DEVICE — APPLIER MEDIUM LARGE CLIP

## (undated) DEVICE — DRAPE COLUMN DAVINCI XI

## (undated) DEVICE — DRAPE INCISE IOBAN 2 23X17IN

## (undated) DEVICE — GLOVE BIOGEL SENSOR SZ 7.5

## (undated) DEVICE — OBTURATOR BLDLESS 8MM LONG XI

## (undated) DEVICE — LAPAROSCOPIC SELF-FIXATING MESH, RIGHT ANATOMICAL
Type: IMPLANTABLE DEVICE | Site: UMBILICAL | Status: NON-FUNCTIONAL
Brand: PROGRIP

## (undated) DEVICE — GLOVE SENSICARE PI GRN 7

## (undated) DEVICE — CATH INTROCAN SAF 2 IV 20GX1IN

## (undated) DEVICE — BAG PRESSURE INFUSER 1000CC

## (undated) DEVICE — GLOVE SENSICARE PI GRN 7.5

## (undated) DEVICE — GLOVE SENSICARE NEOPRENE 7

## (undated) DEVICE — SYR 10CC LUER LOCK

## (undated) DEVICE — PAD ELECTROSURGICAL SPL W/CORD

## (undated) DEVICE — TRAY MUSCLE LID EYE

## (undated) DEVICE — DRESSING TRANS 4X4 TEGADERM

## (undated) DEVICE — DRAPE ARM DAVINCI XI

## (undated) DEVICE — Device

## (undated) DEVICE — CORD BIPOLAR 12 FOOT

## (undated) DEVICE — GOWN SURGICAL X-LARGE

## (undated) DEVICE — DRIVER NEEDLE DA VINCI

## (undated) DEVICE — SOL ELECTROLUBE ANTI-STIC

## (undated) DEVICE — NDL PNEUMO INSUFFLATI 120MM

## (undated) DEVICE — SUT V-LOC GRN 30CM 12IN 2-0

## (undated) DEVICE — SOL BETADINE 5%

## (undated) DEVICE — TROCAR ENDO Z THREAD KII 5X100

## (undated) DEVICE — SEAL UNIVERSAL 5MM-8MM XI

## (undated) DEVICE — SET TUB INSUFFLATION SUC 20L

## (undated) DEVICE — DENTAL ROLL 1 1/2 X 3/8

## (undated) DEVICE — TUBING SUCTION STRAIGHT .25X20

## (undated) DEVICE — DEVICE CLSR V-LOC 0 GS-21 6IN

## (undated) DEVICE — KIT PINK PAD EXT BODY STRP SHT